# Patient Record
Sex: FEMALE | Race: OTHER | HISPANIC OR LATINO | Employment: FULL TIME | URBAN - METROPOLITAN AREA
[De-identification: names, ages, dates, MRNs, and addresses within clinical notes are randomized per-mention and may not be internally consistent; named-entity substitution may affect disease eponyms.]

---

## 2021-03-05 ENCOUNTER — TELEPHONE (OUTPATIENT)
Dept: OBGYN CLINIC | Facility: CLINIC | Age: 31
End: 2021-03-05

## 2021-03-08 ENCOUNTER — OFFICE VISIT (OUTPATIENT)
Dept: OBGYN CLINIC | Facility: CLINIC | Age: 31
End: 2021-03-08

## 2021-03-08 VITALS
DIASTOLIC BLOOD PRESSURE: 76 MMHG | BODY MASS INDEX: 23.29 KG/M2 | WEIGHT: 136.4 LBS | HEART RATE: 81 BPM | SYSTOLIC BLOOD PRESSURE: 105 MMHG | HEIGHT: 64 IN

## 2021-03-08 DIAGNOSIS — R87.619 ABNORMAL CERVICAL CYTOLOGY: Primary | ICD-10-CM

## 2021-03-08 PROCEDURE — 99202 OFFICE O/P NEW SF 15 MIN: CPT | Performed by: OBSTETRICS & GYNECOLOGY

## 2021-03-08 NOTE — PROGRESS NOTES
Assessment/Plan:     No problem-specific Assessment & Plan notes found for this encounter  Diagnoses and all orders for this visit:    Abnormal cervical cytology    Other orders  -     PARAGARD INTRAUTERINE COPPER IU; 1 Units by Intrauterine route daily    RTO to discuss pathology results with PGY3 and need for possible LEEP  Subjective:      Patient ID: Topher Rojo is a 26 y o  female present to discuss pap/colpo results  Pt is a G0 who had a low grade pap at [facility]  She subsequently was told she needed a LEEP  However, the records she was provided do not include any pathology results  We will sign a record release today and return to discuss the therapy plan  The patient does intent to attempt pregnancy with one year  HPI    The following portions of the patient's history were reviewed and updated as appropriate: allergies, current medications, past family history, past medical history, past social history, past surgical history and problem list     Review of Systems      Objective:      /76   Pulse 81   Ht 5' 4" (1 626 m)   Wt 61 9 kg (136 lb 6 4 oz)   LMP 03/02/2021 Comment: paragard  BMI 23 41 kg/m²          Physical Exam  Vitals signs and nursing note reviewed  Constitutional:       Appearance: Normal appearance  Pulmonary:      Effort: Pulmonary effort is normal    Neurological:      General: No focal deficit present  Mental Status: She is alert and oriented to person, place, and time     Psychiatric:         Mood and Affect: Mood normal          Behavior: Behavior normal

## 2021-04-08 ENCOUNTER — OFFICE VISIT (OUTPATIENT)
Dept: OBGYN CLINIC | Facility: CLINIC | Age: 31
End: 2021-04-08

## 2021-04-08 VITALS
HEART RATE: 73 BPM | SYSTOLIC BLOOD PRESSURE: 110 MMHG | DIASTOLIC BLOOD PRESSURE: 74 MMHG | BODY MASS INDEX: 22.83 KG/M2 | WEIGHT: 133 LBS

## 2021-04-08 DIAGNOSIS — N87.1 DYSPLASIA OF CERVIX, HIGH GRADE CIN 2: Primary | ICD-10-CM

## 2021-04-08 PROCEDURE — 99213 OFFICE O/P EST LOW 20 MIN: CPT | Performed by: OBSTETRICS & GYNECOLOGY

## 2021-04-08 NOTE — PROGRESS NOTES
H&P Exam - Gynecology   Claude Gonzalez 26 y o  female MRN: [id number]  Unit/Bed#:  Encounter: [id number]      History of Present Illness     HPI:  Claude Gonzalez is a 26 y o  G0 with a Paraguard in place who presents in preparation for a LEEP procedure following colposcopy at [facility] for GRAHAM 2 with glandular involvement  The patient does not have current children and is planning on future fertility  The patient inquired about delaying an excisional procedure until after completing her fertility goals  We discussed that given the glandular involvement of her pathology, the recommendation would be to do the LEEP now  However, the patient was told that she ultimately can choose to do what is best for her  She opted too proceed with the procedure, as she was planning to only begin pursuing pregnancy in one year from now and she does not want to delay the procedure for that amount of time  Counseling given on procedural technique along with risks, benefits, and alternatives to the procedure  Risks include thermal damage, pain, bleeding, infection, and acute cardiac or respiratory events  The patient is aware of the risk of IUD displacement or inadvertent cutting of IUD strings  She understands that if the strings are cut prematurely,  Patient is aware that final pathology may include a diagnosis worse than CIN2 including cancer  She denies any medical history  Review of Systems   Constitutional: Negative for activity change, appetite change, chills, diaphoresis, fatigue, fever and unexpected weight change  HENT: Negative for congestion, dental problem, drooling, ear discharge, ear pain, facial swelling, hearing loss, mouth sores, nosebleeds, postnasal drip, rhinorrhea, sinus pressure, sinus pain, sneezing, sore throat, tinnitus, trouble swallowing and voice change  Eyes: Positive for itching  Negative for visual disturbance     Respiratory: Negative for apnea, chest tightness and shortness of breath  Cardiovascular: Negative for chest pain and palpitations  Gastrointestinal: Negative for abdominal pain, anal bleeding, constipation, diarrhea, nausea, rectal pain and vomiting  Endocrine: Negative for polydipsia, polyphagia and polyuria  Genitourinary: Positive for vaginal bleeding and vaginal discharge  Negative for difficulty urinating, dyspareunia, dysuria, frequency, hematuria and vaginal pain  Musculoskeletal: Positive for neck pain  Negative for back pain and joint swelling  Skin: Negative for rash  Neurological: Negative for dizziness, seizures, numbness and headaches  Hematological: Bruises/bleeds easily  Psychiatric/Behavioral: Negative for hallucinations, sleep disturbance and suicidal ideas  The patient is not nervous/anxious  Historical Information   No past medical history on file  Past Surgical History:   Procedure Laterality Date    LASIK       OB/GYN History: G0  Family History   Problem Relation Age of Onset    Lymphoma Paternal Grandmother      Social History   Social History     Substance and Sexual Activity   Alcohol Use Not Currently     Social History     Substance and Sexual Activity   Drug Use Never     Social History     Tobacco Use   Smoking Status Never Smoker   Smokeless Tobacco Never Used       Meds/Allergies   (Not in a hospital admission)    No Known Allergies    Objective   /74   Pulse 73   Wt 60 3 kg (133 lb)   LMP 03/24/2021   BMI 22 83 kg/m²     Physical Exam  Constitutional:       General: She is not in acute distress  Appearance: Normal appearance  She is normal weight  She is not ill-appearing, toxic-appearing or diaphoretic  HENT:      Head: Normocephalic and atraumatic  Neck:      Musculoskeletal: Normal range of motion and neck supple  Cardiovascular:      Rate and Rhythm: Normal rate and regular rhythm  Heart sounds: No murmur  No friction rub  No gallop      Pulmonary:      Effort: Pulmonary effort is normal  No respiratory distress  Breath sounds: Normal breath sounds  No stridor  No wheezing, rhonchi or rales  Chest:      Chest wall: No tenderness  Musculoskeletal:         General: No swelling, tenderness, deformity or signs of injury  Right lower leg: No edema  Left lower leg: No edema  Skin:     General: Skin is warm and dry  Coloration: Skin is not jaundiced or pale  Findings: No bruising, erythema, lesion or rash  Neurological:      General: No focal deficit present  Mental Status: She is alert and oriented to person, place, and time  Psychiatric:         Mood and Affect: Mood normal          Behavior: Behavior normal          Thought Content: Thought content normal          Judgment: Judgment normal           Assessment/Plan        A/P: Toni Drummond is a 26 y o  G0 with a Paraguard in place who presents in preparation for a LEEP procedure following colposcopy at [facility] for GRAHAM 2 with glandular involvement    The patient was counseled extensively about the risks, benefits, alternatives, advantages and disadvantages of the LEEP procedure  She desires to proceed with surgery  Consent was signed today  The patient was seen and counseled with Dr Yonny Hernández       Code Status: Full Code     Jmimy Jama MD  4/8/2021  10:22 AM

## 2021-04-12 ENCOUNTER — TELEPHONE (OUTPATIENT)
Dept: OBGYN CLINIC | Facility: CLINIC | Age: 31
End: 2021-04-12

## 2021-04-12 NOTE — TELEPHONE ENCOUNTER
Left voicemail for pt to call [phone] to schedule her LEEP procedure to be done with Dr Nivia Rivera

## 2021-04-26 NOTE — PRE-PROCEDURE INSTRUCTIONS
Pre-Surgery Instructions:   Medication Instructions    Multiple Vitamin (MULTIVITAMIN ADULT PO) Instructed patient per Anesthesia Guidelines   PARAGARD INTRAUTERINE COPPER IU Instructed patient per Anesthesia Guidelines  Instructed has no medications to take the morning of surgery  No aspirin or NSAIDs before surgery starting today

## 2021-04-27 ENCOUNTER — ANESTHESIA EVENT (OUTPATIENT)
Dept: PERIOP | Facility: HOSPITAL | Age: 31
End: 2021-04-27
Payer: COMMERCIAL

## 2021-04-27 PROBLEM — Z98.890 S/P LEEP (LOOP ELECTROSURGICAL EXCISION PROCEDURE): Status: ACTIVE | Noted: 2021-04-27

## 2021-04-27 RX ORDER — SENNOSIDES 8.6 MG
650 CAPSULE ORAL EVERY 8 HOURS PRN
Qty: 30 TABLET | Refills: 0
Start: 2021-04-27

## 2021-04-27 RX ORDER — IBUPROFEN 600 MG/1
600 TABLET ORAL EVERY 6 HOURS PRN
Qty: 30 TABLET | Refills: 0
Start: 2021-04-27

## 2021-04-27 NOTE — DISCHARGE INSTRUCTIONS
Loop Electrosurgical Excision Procedure   WHAT YOU NEED TO KNOW:   What do I need to know about a loop electrosurgical excision procedure (LEEP)? A LEEP is a procedure to remove abnormal tissue from your cervix or vagina  The tissue can be tested for cancer or infection  You may need a LEEP if other tests have found abnormal cells on your cervix or in your vagina  How do I prepare for a LEEP? Your healthcare provider will talk to you about how to prepare for your procedure  Do not douche, use tampons, or have sex for 24 hours before the procedure  Do not put medicines in your vagina for 24 hours before the procedure  Call your healthcare provider if you have your menstrual period on the day of the procedure  You may need to wait until your period ends to have the procedure  Arrange for someone to drive you home and stay with you after your procedure  What will happen during a LEEP? · Your healthcare provider will insert a speculum in your vagina  A speculum is an instrument that holds the vagina open so your provider can see your cervix  You will be given local anesthesia to numb your cervix  With local anesthesia, you may feel pressure or pushing during your procedure, but you should not feel pain  · Your healthcare provider will insert a tube with a looped wire at the end into your vagina  He or she will use this instrument to remove a sample of tissue from your cervix  You may feel pain or cramping when the sample is taken  You may also feel dizzy  Tell your healthcare provider if the dizziness gets worse  Your healthcare provider may use a paste or tools to control bleeding  What will happen after a LEEP? Your healthcare provider will monitor you for heavy bleeding  You may have cramping, bleeding, or dark brown discharge after your procedure  These symptoms may last up to 4 weeks  What are the risks of a LEEP? You may bleed more than expected or get an infection   Your menstrual periods may feel more painful after the procedure  You may have problems getting pregnant or be at risk for a miscarriage or  birth  If you do get pregnant, your baby may be underweight  CARE AGREEMENT:   You have the right to help plan your care  Learn about your health condition and how it may be treated  Discuss treatment options with your healthcare providers to decide what care you want to receive  You always have the right to refuse treatment  The above information is an  only  It is not intended as medical advice for individual conditions or treatments  Talk to your doctor, nurse or pharmacist before following any medical regimen to see if it is safe and effective for you  © Copyright [address] Information is for End User's use only and may not be sold, redistributed or otherwise used for commercial purposes   All illustrations and images included in CareNotes® are the copyrighted property of [employer]  or [address] [place] [address]

## 2021-04-28 ENCOUNTER — HOSPITAL ENCOUNTER (OUTPATIENT)
Facility: HOSPITAL | Age: 31
Setting detail: OUTPATIENT SURGERY
Discharge: HOME | End: 2021-04-28
Attending: OBSTETRICS & GYNECOLOGY | Admitting: OBSTETRICS & GYNECOLOGY
Payer: COMMERCIAL

## 2021-04-28 ENCOUNTER — ANESTHESIA (OUTPATIENT)
Dept: PERIOP | Facility: HOSPITAL | Age: 31
End: 2021-04-28
Payer: COMMERCIAL

## 2021-04-28 VITALS
DIASTOLIC BLOOD PRESSURE: 52 MMHG | HEIGHT: 64 IN | WEIGHT: 133 LBS | BODY MASS INDEX: 22.71 KG/M2 | TEMPERATURE: 97.2 F | HEART RATE: 80 BPM | OXYGEN SATURATION: 98 % | RESPIRATION RATE: 18 BRPM | SYSTOLIC BLOOD PRESSURE: 91 MMHG

## 2021-04-28 DIAGNOSIS — N87.1 DYSPLASIA OF CERVIX, HIGH GRADE CIN 2: ICD-10-CM

## 2021-04-28 DIAGNOSIS — Z98.890 S/P LEEP (LOOP ELECTROSURGICAL EXCISION PROCEDURE): Primary | ICD-10-CM

## 2021-04-28 LAB
EXT PREGNANCY TEST URINE: NEGATIVE
EXT. CONTROL: NORMAL

## 2021-04-28 PROCEDURE — 88305 TISSUE EXAM BY PATHOLOGIST: CPT | Performed by: PATHOLOGY

## 2021-04-28 PROCEDURE — 57522 CONIZATION OF CERVIX: CPT | Performed by: OBSTETRICS & GYNECOLOGY

## 2021-04-28 PROCEDURE — 81025 URINE PREGNANCY TEST: CPT | Performed by: OBSTETRICS & GYNECOLOGY

## 2021-04-28 PROCEDURE — 88344 IMHCHEM/IMCYTCHM EA MLT ANTB: CPT | Performed by: PATHOLOGY

## 2021-04-28 PROCEDURE — 88307 TISSUE EXAM BY PATHOLOGIST: CPT | Performed by: PATHOLOGY

## 2021-04-28 RX ORDER — FENTANYL CITRATE 50 UG/ML
INJECTION, SOLUTION INTRAMUSCULAR; INTRAVENOUS AS NEEDED
Status: DISCONTINUED | OUTPATIENT
Start: 2021-04-28 | End: 2021-04-28

## 2021-04-28 RX ORDER — FENTANYL CITRATE/PF 50 MCG/ML
50 SYRINGE (ML) INJECTION
Status: DISCONTINUED | OUTPATIENT
Start: 2021-04-28 | End: 2021-04-28 | Stop reason: HOSPADM

## 2021-04-28 RX ORDER — HYDROMORPHONE HCL/PF 1 MG/ML
0.5 SYRINGE (ML) INJECTION
Status: DISCONTINUED | OUTPATIENT
Start: 2021-04-28 | End: 2021-04-28 | Stop reason: HOSPADM

## 2021-04-28 RX ORDER — ONDANSETRON 2 MG/ML
4 INJECTION INTRAMUSCULAR; INTRAVENOUS EVERY 6 HOURS PRN
Status: DISCONTINUED | OUTPATIENT
Start: 2021-04-28 | End: 2021-04-28 | Stop reason: HOSPADM

## 2021-04-28 RX ORDER — ONDANSETRON 2 MG/ML
4 INJECTION INTRAMUSCULAR; INTRAVENOUS ONCE AS NEEDED
Status: DISCONTINUED | OUTPATIENT
Start: 2021-04-28 | End: 2021-04-28 | Stop reason: HOSPADM

## 2021-04-28 RX ORDER — LIDOCAINE HYDROCHLORIDE 20 MG/ML
INJECTION, SOLUTION EPIDURAL; INFILTRATION; INTRACAUDAL; PERINEURAL AS NEEDED
Status: DISCONTINUED | OUTPATIENT
Start: 2021-04-28 | End: 2021-04-28

## 2021-04-28 RX ORDER — MIDAZOLAM HYDROCHLORIDE 2 MG/2ML
INJECTION, SOLUTION INTRAMUSCULAR; INTRAVENOUS AS NEEDED
Status: DISCONTINUED | OUTPATIENT
Start: 2021-04-28 | End: 2021-04-28

## 2021-04-28 RX ORDER — ACETAMINOPHEN 325 MG/1
650 TABLET ORAL EVERY 6 HOURS SCHEDULED
Status: DISCONTINUED | OUTPATIENT
Start: 2021-04-28 | End: 2021-04-28 | Stop reason: HOSPADM

## 2021-04-28 RX ORDER — SODIUM CHLORIDE 9 MG/ML
125 INJECTION, SOLUTION INTRAVENOUS CONTINUOUS
Status: DISCONTINUED | OUTPATIENT
Start: 2021-04-28 | End: 2021-04-28

## 2021-04-28 RX ORDER — KETOROLAC TROMETHAMINE 30 MG/ML
INJECTION, SOLUTION INTRAMUSCULAR; INTRAVENOUS AS NEEDED
Status: DISCONTINUED | OUTPATIENT
Start: 2021-04-28 | End: 2021-04-28

## 2021-04-28 RX ORDER — PROPOFOL 10 MG/ML
INJECTION, EMULSION INTRAVENOUS AS NEEDED
Status: DISCONTINUED | OUTPATIENT
Start: 2021-04-28 | End: 2021-04-28

## 2021-04-28 RX ADMIN — SODIUM CHLORIDE 125 ML/HR: 0.9 INJECTION, SOLUTION INTRAVENOUS at 12:50

## 2021-04-28 RX ADMIN — PROPOFOL 200 MG: 10 INJECTION, EMULSION INTRAVENOUS at 13:47

## 2021-04-28 RX ADMIN — MIDAZOLAM 2 MG: 1 INJECTION INTRAMUSCULAR; INTRAVENOUS at 13:41

## 2021-04-28 RX ADMIN — FENTANYL CITRATE 25 MCG: 50 INJECTION INTRAMUSCULAR; INTRAVENOUS at 13:41

## 2021-04-28 RX ADMIN — LIDOCAINE HYDROCHLORIDE 50 MG: 20 INJECTION, SOLUTION EPIDURAL; INFILTRATION; INTRACAUDAL; PERINEURAL at 13:47

## 2021-04-28 RX ADMIN — KETOROLAC TROMETHAMINE 30 MG: 30 INJECTION, SOLUTION INTRAMUSCULAR at 13:59

## 2021-04-28 RX ADMIN — FENTANYL CITRATE 25 MCG: 50 INJECTION INTRAMUSCULAR; INTRAVENOUS at 13:48

## 2021-04-28 NOTE — ANESTHESIA PREPROCEDURE EVALUATION
Procedure:  BIOPSY LEEP CERVIX (N/A Cervix)    Relevant Problems   Other   (+) Dysplasia of cervix, high grade GRAHAM 2   (+) S/P LEEP (loop electrosurgical excision procedure)        Physical Exam    Airway    Mallampati score: I  TM Distance: >3 FB  Neck ROM: full     Dental   No notable dental hx     Cardiovascular  Rhythm: regular, Rate: normal, Cardiovascular exam normal    Pulmonary  Pulmonary exam normal Breath sounds clear to auscultation,     Other Findings        Anesthesia Plan  ASA Score- 2     Anesthesia Type- general with ASA Monitors  Additional Monitors:   Airway Plan: LMA  Plan Factors-Exercise tolerance (METS): >4 METS  Chart reviewed  Patient summary reviewed  Patient is not a current smoker  Patient instructed to abstain from smoking on day of procedure  Obstructive sleep apnea risk education given perioperatively  Induction- intravenous  Postoperative Plan-     Informed Consent- Anesthetic plan and risks discussed with patient

## 2021-04-28 NOTE — ANESTHESIA POSTPROCEDURE EVALUATION
Post-Op Assessment Note    CV Status:  Stable    Pain management: adequate     Mental Status:  Alert and awake   Hydration Status:  Euvolemic   PONV Controlled:  Controlled   Airway Patency:  Patent      Post Op Vitals Reviewed: Yes      Staff: Anesthesiologist         No complications documented      BP     Temp      Pulse     Resp      SpO2

## 2021-04-28 NOTE — OP NOTE
OPERATIVE REPORT  PATIENT NAME: Estrella Maxwell    :  1990  MRN: [id number]  Pt Location: [facility] OR ROOM 05    SURGERY DATE: 2021    Surgeon(s) and Role:     * Heather Farmer MD - Primary     * Yadira Galvan DO - Assisting    Preop Diagnosis:  Dysplasia of cervix, high grade GRAHAM 2 [N87 1]    Post-Op Diagnosis Codes: * Dysplasia of cervix, high grade GRAHAM 2 [N87 1]    Procedure(s) (LRB):  BIOPSY LEEP CERVIX (N/A)    Specimen(s):  ID Type Source Tests Collected by Time Destination   1 : exocervix Tissue Cervix TISSUE EXAM Heather Farmer MD 2021 1356    2 : endocervical currettings Tissue Endocervical TISSUE EXAM Heather Farmer MD 2021 1356        Estimated Blood Loss: None      Anesthesia Type:   General    Operative Indications:  Dysplasia of cervix, high grade GRAHAM 2 [N87 1]      Operative Findings:  Normal appearing vulva, vagina, cervix with no gross lesions  Minimal descensus noted  IUD strings in place    Complications:   None    Procedure and Technique:    The patient was taken to the operating room  General LMA sedation was administered and found to be adequate  The patient was then positioned on the operating table in dorsolithotomy position with legs supported by stirrups and SCDs bilaterally  All pressure points were padded  Warm blanket was placed to maintain control of core body temperature  The patient was then prepped and draped in usual sterile fashion  A bimanual exam was performed and uterus was found to be small, anteverted and mobile  Operative technique: A timeout was performed to confirm correct patient and correct procedure  The bladder was emptied with a straight catheter and 5 milliliters of clear yellow urine were obtained  A bivalved coated speculum was inserted into the vagina and the cervix was visualized  The 10x10 loop electrode was selected and used to excise the cervical sample using the loop  A posterior and anterior lip were both taken   The gross specimen was removed and sent to pathology  Endocervical curettage was completed using the Gregorian curette, placed on Telfa, and also sent to pathology  Good hemostasis was confirmed  IUD strings were still noted to be in place  The bivalve speculum was removed from the vagina  At the completion of the procedure all needle, sponge, instrument counts were noted to be correct ×2  Dr Kip Barron was present for all key portions of the procedure        Patient Disposition:  PACU  and extubated and stable    SIGNATURE: Galilea Moore DO  DATE: April 28, 2021  TIME: 2:02 PM

## 2021-04-29 ENCOUNTER — TELEPHONE (OUTPATIENT)
Dept: OBGYN CLINIC | Facility: CLINIC | Age: 31
End: 2021-04-29

## 2021-04-29 NOTE — TELEPHONE ENCOUNTER
Spoke to patient on the phone, she wanted to know when she could start exercising after her procedure  Discussed with Dr Darlin Calle  She can resume exercising after 1-2 days

## 2021-05-12 ENCOUNTER — TELEPHONE (OUTPATIENT)
Dept: OBGYN CLINIC | Facility: CLINIC | Age: 31
End: 2021-05-12

## 2021-05-14 ENCOUNTER — TELEPHONE (OUTPATIENT)
Dept: OBGYN CLINIC | Facility: CLINIC | Age: 31
End: 2021-05-14

## 2021-05-14 NOTE — TELEPHONE ENCOUNTER
Called patient to offer post-op appointment, left message via voicemail asking patient to call office

## 2021-05-18 ENCOUNTER — OFFICE VISIT (OUTPATIENT)
Dept: OBGYN CLINIC | Facility: CLINIC | Age: 31
End: 2021-05-18

## 2021-05-18 VITALS
SYSTOLIC BLOOD PRESSURE: 110 MMHG | DIASTOLIC BLOOD PRESSURE: 67 MMHG | WEIGHT: 132 LBS | BODY MASS INDEX: 22.66 KG/M2 | HEART RATE: 88 BPM

## 2021-05-18 DIAGNOSIS — N87.1 DYSPLASIA OF CERVIX, HIGH GRADE CIN 2: Primary | ICD-10-CM

## 2021-05-18 PROCEDURE — 99024 POSTOP FOLLOW-UP VISIT: CPT | Performed by: OBSTETRICS & GYNECOLOGY

## 2021-05-18 NOTE — ASSESSMENT & PLAN NOTE
The leep showed GRAHAM 3   The margins are +  She will need repeat pap in 4-6 months    Exam today   The cervix is healing well  There is no sign of infection     Follow up in sept for repeat pap

## 2021-05-18 NOTE — PROGRESS NOTES
Problem List Items Addressed This Visit        Genitourinary    Dysplasia of cervix, high grade GRAHAM 2 - Primary     The leep showed GRAHAM 3   The margins are +  She will need repeat pap in 4-6 months    Exam today   The cervix is healing well  There is no sign of infection     Follow up in sept for repeat pap

## 2021-09-07 ENCOUNTER — ANNUAL EXAM (OUTPATIENT)
Dept: OBGYN CLINIC | Facility: CLINIC | Age: 31
End: 2021-09-07

## 2021-09-07 VITALS
HEART RATE: 66 BPM | WEIGHT: 132.8 LBS | BODY MASS INDEX: 22.8 KG/M2 | SYSTOLIC BLOOD PRESSURE: 95 MMHG | DIASTOLIC BLOOD PRESSURE: 63 MMHG

## 2021-09-07 DIAGNOSIS — Z01.419 WOMEN'S ANNUAL ROUTINE GYNECOLOGICAL EXAMINATION: ICD-10-CM

## 2021-09-07 DIAGNOSIS — Z01.00 ENCOUNTER FOR VISUAL TESTING: Primary | ICD-10-CM

## 2021-09-07 PROCEDURE — G0476 HPV COMBO ASSAY CA SCREEN: HCPCS | Performed by: OBSTETRICS & GYNECOLOGY

## 2021-09-07 PROCEDURE — G0145 SCR C/V CYTO,THINLAYER,RESCR: HCPCS | Performed by: OBSTETRICS & GYNECOLOGY

## 2021-09-07 PROCEDURE — 0503F POSTPARTUM CARE VISIT: CPT | Performed by: OBSTETRICS & GYNECOLOGY

## 2021-09-07 PROCEDURE — 99213 OFFICE O/P EST LOW 20 MIN: CPT | Performed by: OBSTETRICS & GYNECOLOGY

## 2021-09-07 RX ORDER — ERYTHROMYCIN 5 MG/G
OINTMENT OPHTHALMIC
COMMUNITY
Start: 2021-08-17

## 2021-09-07 NOTE — PROGRESS NOTES
[facility]  Name: Valentino Laurel  MRN: [id number]  : 1990      SUBJECTIVE:     Valentino Laurel is a 33 y o  G0 who presents for annual well woman exam      Contraception: Paragard IUD (since 2020)    Reports periods are heavy and crampy  LMP about 1 month  Cycles have not been super regular since getting the Paragard  S/p LEEP in May 2021 for CIN2 w/ glandular involvement  LEEP = CIN2 w/ positive margins  Currently sexually active w/ 1 male partner  Declines STD testing  Interested in future fertility  Denies any issues/concerns today  Review of Systems  Pertinent items are noted in HPI  OBJECTIVE:     BP 95/63   Pulse 66   Wt 60 2 kg (132 lb 12 8 oz)   LMP 2021 (Exact Date)   BMI 22 80 kg/m²     Physical Exam  Constitutional:       General: She is not in acute distress  Appearance: She is not ill-appearing  HENT:      Head: Normocephalic and atraumatic  Cardiovascular:      Rate and Rhythm: Normal rate  Pulmonary:      Effort: Pulmonary effort is normal  No respiratory distress  Chest:      Breasts:         Right: Normal  No bleeding, mass, nipple discharge, skin change or tenderness  Left: Normal  No bleeding, mass, nipple discharge, skin change or tenderness  Abdominal:      General: Abdomen is flat  There is no distension  Palpations: Abdomen is soft  Tenderness: There is no abdominal tenderness  Genitourinary:     Comments:   Brown discharge visualized; some bleeding noted after pap  Skin:     General: Skin is warm and dry  Neurological:      Mental Status: She is alert and oriented to person, place, and time     Psychiatric:         Mood and Affect: Mood normal          Behavior: Behavior normal          ASSESSMENT/PLAN:  - Pap smear collected; discussed high possibility of needing colposcopy given recent LEEP w/ CIN2 w/ positive margins  - Discussed alternate forms of contraception w/ better bleeding profiles (ie Mirena IUD)  Patient will think about it and reach out to office        Vj Partida MD  OB/GYN PGY-4  9/7/2021

## 2021-09-08 LAB
HPV HR 12 DNA CVX QL NAA+PROBE: NEGATIVE
HPV16 DNA CVX QL NAA+PROBE: NEGATIVE
HPV18 DNA CVX QL NAA+PROBE: NEGATIVE

## 2021-09-10 LAB
LAB AP GYN PRIMARY INTERPRETATION: NORMAL
Lab: NORMAL

## 2021-09-20 ENCOUNTER — TELEPHONE (OUTPATIENT)
Dept: OBGYN CLINIC | Facility: CLINIC | Age: 31
End: 2021-09-20

## 2021-09-20 NOTE — TELEPHONE ENCOUNTER
----- Message from Tomas Sanders MD sent at 9/19/2021 10:10 PM EDT -----  Negative pap and HPV  Will need co-testing again in 5 years

## 2022-02-24 ENCOUNTER — OFFICE VISIT (OUTPATIENT)
Dept: OBGYN CLINIC | Facility: CLINIC | Age: 32
End: 2022-02-24
Payer: COMMERCIAL

## 2022-02-24 VITALS — BODY MASS INDEX: 22.31 KG/M2 | WEIGHT: 130 LBS | SYSTOLIC BLOOD PRESSURE: 115 MMHG | DIASTOLIC BLOOD PRESSURE: 70 MMHG

## 2022-02-24 DIAGNOSIS — N92.0 MENORRHAGIA WITH REGULAR CYCLE: Primary | ICD-10-CM

## 2022-02-24 PROCEDURE — 99213 OFFICE O/P EST LOW 20 MIN: CPT | Performed by: OBSTETRICS & GYNECOLOGY

## 2022-02-28 NOTE — PROGRESS NOTES
IUD issues    Pt reports that she is having a lot of cramping and bleeding  She is considering removing the IUD  She will be thinking about get pregnant in the next few months also     Sonogram confirms that the iud is in the correct spot    We went over other options such as removing the IUD and then switching to Pills or another IUD  She is unsure bc her goal will be to get pregnant soon    Also went over that she had a pap that was negative after LEEP  She will need one yearly x 3 next is due in September will come for   Also will decided then if she is ready to try

## 2022-07-29 ENCOUNTER — PROCEDURE VISIT (OUTPATIENT)
Dept: OBGYN CLINIC | Facility: MEDICAL CENTER | Age: 32
End: 2022-07-29
Payer: COMMERCIAL

## 2022-07-29 VITALS
SYSTOLIC BLOOD PRESSURE: 124 MMHG | WEIGHT: 132 LBS | BODY MASS INDEX: 22.53 KG/M2 | DIASTOLIC BLOOD PRESSURE: 70 MMHG | HEIGHT: 64 IN

## 2022-07-29 DIAGNOSIS — Z30.432 ENCOUNTER FOR IUD REMOVAL: Primary | ICD-10-CM

## 2022-07-29 RX ORDER — MULTIVIT-MIN/FOLIC/VIT K/LYCOP 400-300MCG
1 TABLET ORAL DAILY
COMMUNITY

## 2022-07-31 PROCEDURE — 58301 REMOVE INTRAUTERINE DEVICE: CPT | Performed by: OBSTETRICS & GYNECOLOGY

## 2022-08-01 NOTE — PROGRESS NOTES
Iud removal    Date/Time: 7/31/2022 9:02 PM  Performed by: Saritha Moura MD  Authorized by: Saritha Moura MD   Universal Protocol:  Consent: Verbal consent obtained  Written consent obtained  Risks and benefits: risks, benefits and alternatives were discussed  Consent given by: patient  Time out: Immediately prior to procedure a "time out" was called to verify the correct patient, procedure, equipment, support staff and site/side marked as required  Patient understanding: patient states understanding of the procedure being performed  Patient consent: the patient's understanding of the procedure matches consent given  Procedure consent: procedure consent matches procedure scheduled  Site marked: the operative site was not marked  Radiology Images displayed and confirmed   If images not available, report reviewed: imaging studies not available  Required items: required blood products, implants, devices, and special equipment available  Patient identity confirmed: verbally with patient      Procedure:     Removed with no complications: yes      Removal due to mechanical complications of IUD: no      Removal due to infection and inflammatory reaction: no      Other reason for removal:  Pt wishes to start a family

## 2022-09-08 ENCOUNTER — ANNUAL EXAM (OUTPATIENT)
Dept: OBGYN CLINIC | Facility: CLINIC | Age: 32
End: 2022-09-08

## 2022-09-08 VITALS
HEART RATE: 60 BPM | WEIGHT: 133.2 LBS | DIASTOLIC BLOOD PRESSURE: 59 MMHG | SYSTOLIC BLOOD PRESSURE: 93 MMHG | BODY MASS INDEX: 22.74 KG/M2 | HEIGHT: 64 IN

## 2022-09-08 DIAGNOSIS — Z98.890 S/P LEEP (LOOP ELECTROSURGICAL EXCISION PROCEDURE): Primary | ICD-10-CM

## 2022-09-08 DIAGNOSIS — Z78.9 ATTEMPTING TO CONCEIVE: ICD-10-CM

## 2022-09-08 DIAGNOSIS — Z01.419 WELL WOMAN EXAM: ICD-10-CM

## 2022-09-08 PROCEDURE — G0476 HPV COMBO ASSAY CA SCREEN: HCPCS

## 2022-09-08 PROCEDURE — G0145 SCR C/V CYTO,THINLAYER,RESCR: HCPCS

## 2022-09-08 RX ORDER — PRENATAL WITH FERROUS FUM AND FOLIC ACID 3080; 920; 120; 400; 22; 1.84; 3; 20; 10; 1; 12; 200; 27; 25; 2 [IU]/1; [IU]/1; MG/1; [IU]/1; MG/1; MG/1; MG/1; MG/1; MG/1; MG/1; UG/1; MG/1; MG/1; MG/1; MG/1
1 TABLET ORAL DAILY
Qty: 30 TABLET | Refills: 2 | Status: SHIPPED | OUTPATIENT
Start: 2022-09-08

## 2022-09-08 NOTE — PROGRESS NOTES
ASSESSMENT & PLAN: Elizabeth Hudson is a 27 y o  Kori Pardo with normal gynecologic exam     1   Routine well woman exam done today  2    Pap and HPV:Pap with HPV was done today  Current ASCCP Guidelines reviewed  Last Pap 2021 NILM, HPV negative  History of previous LEEP in 2021 for GRAHAM 2  3  The patient declined STD testing  Safe sex practices have been discussed  4  The patient is sexually active  She declined contraception as she is currently attempting conceive  5  The following were reviewed in today's visit: STD testing, exercise and healthy diet  6  Patient to return to office in 12 months for annual visit   All questions have been answered to her satisfaction  CC:  Annual Gynecologic Examination    HPI: Elizabeth Hudson is a 27 y o  Kori Pardo who presents for annual gynecologic examination  She denies any concerns at this time  Reports a history of GRAHAM 2 and LEEP in 2021  She had a Pap in 2021 that was NILM and HPV negative  Repeat PAP/HPV collecting today  Ezell Paget had her IUD (Suman Cj) removed in 2021 because she is attempting to conceive  Reports that she had her menses in August and it was lighter and less painful since ParaGard removal  Encouraged to begin prenatal vitamins, Rx sent to pharmacy  Health Maintenance:    She exercises 3 days per week  She wears her seatbelt routinely  She does perform irregular monthly self breast exams  She feels safe at home  Patients does not follow a  diet  Generally avoids sugar  No past medical history on file  Past Surgical History:   Procedure Laterality Date    LASIK      UT CONIZATION CERVIX,LOOP ELECTRD N/A 2021    Procedure: BIOPSY LEEP CERVIX;  Surgeon: Saritha Moura MD;  Location: [facility] OR;  Service: Gynecology       Past OB/Gyn History:   Patient's last menstrual period was 2022 (exact date)    Menstrual History:  OB History        0    Para   0    Term   0       0    AB   0    Living 0       SAB   0    IAB   0    Ectopic   0    Multiple   0    Live Births   0                Patient's last menstrual period was 08/07/2022 (exact date)  History of sexually transmitted infection No  Patient is currently sexually active  heterosexual Birth control: none  Last Pap     Family History   Problem Relation Age of Onset    Lymphoma Paternal Grandmother        Social History:  Social History     Socioeconomic History    Marital status:      Spouse name: Not on file    Number of children: Not on file    Years of education: Not on file    Highest education level: Not on file   Occupational History    Not on file   Tobacco Use    Smoking status: Never Smoker    Smokeless tobacco: Never Used   Vaping Use    Vaping Use: Never used   Substance and Sexual Activity    Alcohol use: Yes    Drug use: Never    Sexual activity: Yes   Other Topics Concern    Not on file   Social History Narrative    Not on file     Social Determinants of Health     Financial Resource Strain: Low Risk     Difficulty of Paying Living Expenses: Not hard at all   Food Insecurity: No Food Insecurity    Worried About Running Out of Food in the Last Year: Never true    [address] in the Last Year: Never true   Transportation Needs: No Transportation Needs    Lack of Transportation (Medical): No    Lack of Transportation (Non-Medical): No   Physical Activity: Not on file   Stress: Not on file   Social Connections: Not on file   Intimate Partner Violence: Not on file   Housing Stability: Not on file     Presently lives with her boyfriend  Patient is co-habitating  Patient is currently employed as an  in an insurance company       No Known Allergies    Current Outpatient Medications:     acetaminophen (TYLENOL) 650 mg CR tablet, Take 1 tablet (650 mg total) by mouth every 8 (eight) hours as needed for mild pain (Patient not taking: No sig reported), Disp: 30 tablet, Rfl: 0    erythromycin (ILOTYCIN) ophthalmic ointment, Administer to the left lower eye lid nightly for 7 days (Patient not taking: No sig reported), Disp: , Rfl:     ibuprofen (MOTRIN) 600 mg tablet, Take 1 tablet (600 mg total) by mouth every 6 (six) hours as needed for moderate pain (Patient not taking: Reported on 9/8/2022), Disp: 30 tablet, Rfl: 0    Multiple Vitamin (MULTIVITAMIN ADULT PO), Take 1 tablet by mouth daily (Patient not taking: Reported on 9/8/2022), Disp: , Rfl:     PARAGARD INTRAUTERINE COPPER IU, 1 Units by Intrauterine route daily (Patient not taking: Reported on 9/8/2022), Disp: , Rfl:     Pediatric Multivitamins-Iron (Multivitamins Plus Iron Child) 18 MG CHEW, Chew 1 tablet daily (Patient not taking: Reported on 9/8/2022), Disp: , Rfl:     Review of Systems:  Review of Systems   Constitutional: Negative  HENT: Negative  Eyes: Negative  Respiratory: Negative  Cardiovascular: Negative  Gastrointestinal: Negative  Endocrine: Negative  Genitourinary: Negative for vaginal discharge and vaginal pain  Musculoskeletal: Negative  Skin: Negative  Allergic/Immunologic: Negative  Neurological: Positive for headaches  Hematological: Negative  Psychiatric/Behavioral: Negative  Physical Exam:  BP 93/59   Pulse 60   Ht 5' 4" (1 626 m)   Wt 60 4 kg (133 lb 3 2 oz)   LMP 08/07/2022 (Exact Date)   BMI 22 86 kg/m²    Physical Exam  Constitutional:       General: She is not in acute distress  Appearance: Normal appearance  Genitourinary:      Right Labia: No rash, tenderness or lesions  Left Labia: No tenderness, lesions or rash  No labial fusion noted  No vaginal discharge, tenderness or bleeding  HENT:      Head: Normocephalic and atraumatic  Cardiovascular:      Rate and Rhythm: Normal rate and regular rhythm  Heart sounds: Normal heart sounds  Pulmonary:      Effort: Pulmonary effort is normal       Breath sounds: Normal breath sounds     Abdominal: Palpations: Abdomen is soft  Tenderness: There is no abdominal tenderness  Neurological:      General: No focal deficit present  Mental Status: She is alert  Skin:     General: Skin is warm and dry     Psychiatric:         Mood and Affect: Mood normal

## 2022-09-14 LAB
LAB AP GYN PRIMARY INTERPRETATION: NORMAL
LAB AP LMP: NORMAL
Lab: NORMAL

## 2022-11-07 ENCOUNTER — APPOINTMENT (OUTPATIENT)
Dept: LAB | Facility: CLINIC | Age: 32
End: 2022-11-07

## 2022-11-07 ENCOUNTER — TELEPHONE (OUTPATIENT)
Dept: OBGYN CLINIC | Facility: CLINIC | Age: 32
End: 2022-11-07

## 2022-11-07 DIAGNOSIS — N92.6 MISSED MENSES: ICD-10-CM

## 2022-11-07 DIAGNOSIS — N92.6 MISSED MENSES: Primary | ICD-10-CM

## 2022-11-07 LAB
ABO GROUP BLD: NORMAL
B-HCG SERPL-ACNC: ABNORMAL MIU/ML
BLD GP AB SCN SERPL QL: NEGATIVE
PROGEST SERPL-MCNC: 20 NG/ML
RH BLD: POSITIVE
SPECIMEN EXPIRATION DATE: NORMAL

## 2022-11-07 NOTE — TELEPHONE ENCOUNTER
Pt requested appointment due to positive pregnancy test      LMP 9/13/2022    Placed labs for patient to get drawn; instructed pt that we will call her with her lab results after a provider is able to review them  May take 3-5 buiness days once resulted

## 2022-11-09 ENCOUNTER — TELEPHONE (OUTPATIENT)
Dept: OBGYN CLINIC | Facility: MEDICAL CENTER | Age: 32
End: 2022-11-09

## 2022-11-09 NOTE — TELEPHONE ENCOUNTER
Pt called in would like to review lab results from 11/7 and set up dating u/s, please review when available thank you

## 2022-11-14 ENCOUNTER — TELEPHONE (OUTPATIENT)
Dept: OBGYN CLINIC | Facility: MEDICAL CENTER | Age: 32
End: 2022-11-14

## 2022-11-14 DIAGNOSIS — Z32.01 POSITIVE BLOOD PREGNANCY TEST: Primary | ICD-10-CM

## 2022-11-14 NOTE — TELEPHONE ENCOUNTER
----- Message from Imelda Irvin MD sent at 11/14/2022  9:35 AM EST -----  HCG level reviewed  The level is [address]  Set up ultrasound for dating

## 2022-11-15 ENCOUNTER — HOSPITAL ENCOUNTER (OUTPATIENT)
Dept: ULTRASOUND IMAGING | Facility: HOSPITAL | Age: 32
Discharge: HOME | End: 2022-11-15
Attending: OBSTETRICS & GYNECOLOGY

## 2022-11-15 DIAGNOSIS — Z32.01 POSITIVE BLOOD PREGNANCY TEST: ICD-10-CM

## 2022-11-18 ENCOUNTER — TELEPHONE (OUTPATIENT)
Dept: OBGYN CLINIC | Facility: MEDICAL CENTER | Age: 32
End: 2022-11-18

## 2022-12-12 ENCOUNTER — INITIAL PRENATAL (OUTPATIENT)
Dept: OBGYN CLINIC | Facility: MEDICAL CENTER | Age: 32
End: 2022-12-12

## 2022-12-12 VITALS
BODY MASS INDEX: 21.17 KG/M2 | SYSTOLIC BLOOD PRESSURE: 104 MMHG | DIASTOLIC BLOOD PRESSURE: 60 MMHG | HEIGHT: 64 IN | WEIGHT: 124 LBS

## 2022-12-12 DIAGNOSIS — Z34.01 ENCOUNTER FOR SUPERVISION OF NORMAL FIRST PREGNANCY IN FIRST TRIMESTER: Primary | ICD-10-CM

## 2022-12-12 NOTE — PROGRESS NOTES
OB History    Para Term  AB Living   1 0 0 0 0 0   SAB IAB Ectopic Multiple Live Births   0 0 0 0 0      # Outcome Date GA Lbr Melchor/2nd Weight Sex Delivery Anes PTL Lv   1 Current                  * Pt presents for OB intake  *  *Pt's LMP was Patient's last menstrual period was 2022  *Ultrasound date:11/15/2022   8weeks 1days  *Estimated date of delivery: 2023   * confirmed by us    *Signs/Symptoms of Pregnancy   *no Constipation    *no Headaches   *no Cramping  *no Spotting   Diabetes     *no Hx of GDM    *no BMI >35    *no First degree relative with type 2 diabetes   Hypertension-   *no Hx of chronic HTN   *Infection Screening   *no Does the pt have a hx of MRSA? *no History of herpes? *Immunizations:   *yes Discussed influenza vaccine     declined   *yes Discussed TDaP vaccine   *yes COVID Vaccine Received   ACTIVE MEDICAL/MENTAL HEALTH CONDITIONS  Depression *n   Anxiety*n  Medications*n      *Interview education   *Handouts given:    *Baby [facility] Me support center     *MyChart sign up instructions    *Lab Locations    *[facility] Pediatricians List/Choosing Pediatrician Sheet    *[facility] Childbirth [facility] Parenting Classes    *Schedule for Prenatal Visits    *Pregnancy Warning Signs Reviewed    *Safe Medications During Pregnancy    *SMA and CF Testing information sheet    *CPT Code Sheet/MFM 13week NT pamphlet    *[employer]      SBIRT Screen:  Depression Screening Follow-up Plan: Patient's depression screening was negative with an [place] score of  2        *[facility]   *yes discussed genetic testing- pt interested   Patient has been informed of basic prenatal advice such as avoiding alcohol, drugs, and smoking  She should remain hydrated and take daily prenatal vitamins   Patient should avoid caffeine, raw sprouts, high mercury fish, undercooked fish, raw eggs, organ meat, unwashed produce, and unpasteurized cheeses, milk, and fruit juice and undercooked meats  She has been informed about toxoplasmosis and to avoid cat feces  *Details that I feel the provider should be aware of:  Titus Malcolm was seen for her ob intake at 12w  Prenatal panel ordered  MFM referral placed     PN1 visit scheduled for *12/20/2022  The patient was oriented to our practice and all questions were answered      Interviewed by:  Otis Edwards

## 2022-12-19 ENCOUNTER — TELEPHONE (OUTPATIENT)
Dept: OBGYN CLINIC | Facility: MEDICAL CENTER | Age: 32
End: 2022-12-19

## 2022-12-19 PROBLEM — O34.41: Status: ACTIVE | Noted: 2022-12-19

## 2022-12-19 PROBLEM — Z3A.13 13 WEEKS GESTATION OF PREGNANCY: Status: ACTIVE | Noted: 2022-12-19

## 2022-12-19 PROBLEM — Z98.890: Status: ACTIVE | Noted: 2022-12-19

## 2022-12-19 NOTE — PROGRESS NOTES
Prenatal H&P     Denies loss of fluid, vaginal discharge, vaginal bleeding  and abdominal pain  Not feeling fetal movement yet    Tolerating PNV magnesium and probiotics well  Had prenatal panel drawn this morning  CBC and blood type reviewed  Plans for genetic screening has appointment 22  Planned pregnancy    OB history:     G1- current     Dating:     LMP - 22 WILDER 23     US on 11/15/22 @  8w 1d WILDER 23  Working WILDER 23      Surgical history:     LEEP (21), COVID (2021)  and lasik     Medical History:     GRAHAM 2    Social history:     Alcohol/ tobacco/ illicit drug -denies x3     Denies history of STD/STI     Work/ housing/ support - / house- stable/ FOB, family and friends supportive     PCP/ Dental- last PE 6 months ago/ last cleaning 3 months ago      SBIRT screen negative at intake    She denies a hx of recent cough, chills, fever,  STD/STI, denies a personal history  of TB or Zika virus or close contacts with persons with TB or COVID -23  She denies a family history of inheritable conditions such as physical or intellectual disabilities, birth defects, blood disorders, heart or neural tube defects  She has never had MRSA  Patient Plans   - Feeding-breast-feeding  - Contraception-OCPs  - [facility]ware office delivers at [facility]  If < 32 weeks will recommend evaluation at [address] [place] [address] St:  - Continue prenatal vitamin daily  - Genetic screening/ [facility] appointment 22  -  Weight gain in pregnancy- Who BMI recommend 25-35 lb   Healthy diet and daily exercise reviewed and encouraged  - Unit regimen reviewed visit every 4 weeks until 28 weeks, every 2 weeks until 36 weeks and then weekly until delivery  - gonorrhea/ chlamydia collected    Pap smear up-to-date  - Vaccines in Pregnancy      - TDAP vaccine after 27 gestational weeks     - Reviewed with patient  Pregnancy with COVID infection is considered  high risk and can have poor outcome including  delivery, more severe infection  therefore  pregnant patients are recommended to get  COVID-19 vaccination  Written information provided  Had vaccine x 1     - influenza October- March declines influenza vaccine  -  Common discomforts of pregnancy and precautions reviewed  Signs and symptoms to report reviewed  Encouraged social distancing, good hand hygiene, masking, avoiding crowds and written information provided about COVID-19    RTO 4 weeks f/u labs & US

## 2022-12-20 ENCOUNTER — APPOINTMENT (OUTPATIENT)
Dept: LAB | Facility: CLINIC | Age: 32
End: 2022-12-20

## 2022-12-20 ENCOUNTER — INITIAL PRENATAL (OUTPATIENT)
Dept: OBGYN CLINIC | Facility: CLINIC | Age: 32
End: 2022-12-20

## 2022-12-20 VITALS
HEIGHT: 64 IN | SYSTOLIC BLOOD PRESSURE: 102 MMHG | WEIGHT: 126 LBS | BODY MASS INDEX: 21.51 KG/M2 | DIASTOLIC BLOOD PRESSURE: 62 MMHG

## 2022-12-20 DIAGNOSIS — Z11.3 ROUTINE SCREENING FOR STI (SEXUALLY TRANSMITTED INFECTION): ICD-10-CM

## 2022-12-20 DIAGNOSIS — O34.41 HX OF LEEP (LOOP ELECTROSURGICAL EXCISION PROCEDURE) OF CERVIX COMPLICATING PREGNANCY, FIRST TRIMESTER: Primary | ICD-10-CM

## 2022-12-20 DIAGNOSIS — Z3A.13 13 WEEKS GESTATION OF PREGNANCY: ICD-10-CM

## 2022-12-20 DIAGNOSIS — Z34.01 ENCOUNTER FOR SUPERVISION OF NORMAL FIRST PREGNANCY IN FIRST TRIMESTER: ICD-10-CM

## 2022-12-20 DIAGNOSIS — Z98.890 HX OF LEEP (LOOP ELECTROSURGICAL EXCISION PROCEDURE) OF CERVIX COMPLICATING PREGNANCY, FIRST TRIMESTER: Primary | ICD-10-CM

## 2022-12-20 LAB
ABO GROUP BLD: NORMAL
BACTERIA UR QL AUTO: ABNORMAL /HPF
BASOPHILS # BLD AUTO: 0.07 THOUSANDS/ÂΜL (ref 0–0.1)
BASOPHILS NFR BLD AUTO: 1 % (ref 0–1)
BILIRUB UR QL STRIP: NEGATIVE
BLD GP AB SCN SERPL QL: NEGATIVE
BUDDING YEAST: PRESENT
CLARITY UR: ABNORMAL
COLOR UR: YELLOW
EOSINOPHIL # BLD AUTO: 0.2 THOUSAND/ÂΜL (ref 0–0.61)
EOSINOPHIL NFR BLD AUTO: 3 % (ref 0–6)
ERYTHROCYTE [DISTWIDTH] IN BLOOD BY AUTOMATED COUNT: 13.1 % (ref 11.6–15.1)
GLUCOSE UR STRIP-MCNC: NEGATIVE MG/DL
HBV SURFACE AG SER QL: NORMAL
HCT VFR BLD AUTO: 35.2 % (ref 34.8–46.1)
HGB BLD-MCNC: 11.7 G/DL (ref 11.5–15.4)
HGB UR QL STRIP.AUTO: NEGATIVE
HIV 1+2 AB+HIV1 P24 AG SERPL QL IA: NORMAL
HIV 2 AB SERPL QL IA: NORMAL
HIV1 AB SERPL QL IA: NORMAL
HIV1 P24 AG SERPL QL IA: NORMAL
IMM GRANULOCYTES # BLD AUTO: 0.01 THOUSAND/UL (ref 0–0.2)
IMM GRANULOCYTES NFR BLD AUTO: 0 % (ref 0–2)
KETONES UR STRIP-MCNC: NEGATIVE MG/DL
LEUKOCYTE ESTERASE UR QL STRIP: ABNORMAL
LYMPHOCYTES # BLD AUTO: 1.92 THOUSANDS/ÂΜL (ref 0.6–4.47)
LYMPHOCYTES NFR BLD AUTO: 28 % (ref 14–44)
MCH RBC QN AUTO: 30.6 PG (ref 26.8–34.3)
MCHC RBC AUTO-ENTMCNC: 33.2 G/DL (ref 31.4–37.4)
MCV RBC AUTO: 92 FL (ref 82–98)
MONOCYTES # BLD AUTO: 0.35 THOUSAND/ÂΜL (ref 0.17–1.22)
MONOCYTES NFR BLD AUTO: 5 % (ref 4–12)
NEUTROPHILS # BLD AUTO: 4.42 THOUSANDS/ÂΜL (ref 1.85–7.62)
NEUTS SEG NFR BLD AUTO: 63 % (ref 43–75)
NITRITE UR QL STRIP: NEGATIVE
NON-SQ EPI CELLS URNS QL MICRO: ABNORMAL /HPF
NRBC BLD AUTO-RTO: 0 /100 WBCS
PH UR STRIP.AUTO: 7.5 [PH]
PLATELET # BLD AUTO: 229 THOUSANDS/UL (ref 149–390)
PMV BLD AUTO: 10.7 FL (ref 8.9–12.7)
PROT UR STRIP-MCNC: ABNORMAL MG/DL
RBC # BLD AUTO: 3.82 MILLION/UL (ref 3.81–5.12)
RBC #/AREA URNS AUTO: ABNORMAL /HPF
RH BLD: POSITIVE
RPR SER QL: NORMAL
RUBV IGG SERPL IA-ACNC: 96.3 IU/ML
SP GR UR STRIP.AUTO: 1.02 (ref 1–1.03)
SPECIMEN EXPIRATION DATE: NORMAL
UROBILINOGEN UR STRIP-ACNC: <2 MG/DL
WBC # BLD AUTO: 6.97 THOUSAND/UL (ref 4.31–10.16)
WBC #/AREA URNS AUTO: ABNORMAL /HPF

## 2022-12-20 RX ORDER — MAGNESIUM 30 MG
30 TABLET ORAL 2 TIMES DAILY
COMMUNITY

## 2022-12-20 RX ORDER — SACCHAROMYCES BOULARDII 250 MG
250 CAPSULE ORAL 2 TIMES DAILY
COMMUNITY

## 2022-12-21 LAB
BACTERIA UR CULT: NORMAL
C TRACH DNA SPEC QL NAA+PROBE: NEGATIVE
N GONORRHOEA DNA SPEC QL NAA+PROBE: NEGATIVE

## 2022-12-23 ENCOUNTER — TELEPHONE (OUTPATIENT)
Dept: PERINATAL CARE | Facility: CLINIC | Age: 32
End: 2022-12-23

## 2022-12-23 NOTE — TELEPHONE ENCOUNTER
Lvm regarding rescheduling her genetic counseling appt as an individual with [facility], instead of a class per [facility] staff message  Waiting on patient to call MFM to reschedule

## 2022-12-27 ENCOUNTER — TELEPHONE (OUTPATIENT)
Dept: PERINATAL CARE | Facility: OTHER | Age: 32
End: 2022-12-27

## 2022-12-27 NOTE — TELEPHONE ENCOUNTER
lvm 12/27 at 0945> Patient returned our call on 12/24 at [phone] to re schedule her GC appointment to a individual appointment  # provided to patient on VM to return call

## 2023-01-18 PROBLEM — O34.42 HISTORY OF LOOP ELECTROSURGICAL EXCISION PROCEDURE (LEEP) OF CERVIX AFFECTING PREGNANCY IN SECOND TRIMESTER: Status: ACTIVE | Noted: 2022-12-19

## 2023-01-18 PROBLEM — Z3A.17 17 WEEKS GESTATION OF PREGNANCY: Status: ACTIVE | Noted: 2022-12-19

## 2023-01-23 ENCOUNTER — APPOINTMENT (OUTPATIENT)
Dept: LAB | Facility: CLINIC | Age: 33
End: 2023-01-23

## 2023-01-23 ENCOUNTER — TELEMEDICINE (OUTPATIENT)
Dept: PERINATAL CARE | Facility: CLINIC | Age: 33
End: 2023-01-23

## 2023-01-23 ENCOUNTER — ROUTINE PRENATAL (OUTPATIENT)
Dept: OBGYN CLINIC | Facility: MEDICAL CENTER | Age: 33
End: 2023-01-23

## 2023-01-23 VITALS
BODY MASS INDEX: 22.36 KG/M2 | DIASTOLIC BLOOD PRESSURE: 64 MMHG | SYSTOLIC BLOOD PRESSURE: 102 MMHG | WEIGHT: 131 LBS | HEIGHT: 64 IN

## 2023-01-23 DIAGNOSIS — Z31.430 ENCOUNTER OF FEMALE FOR TESTING FOR GENETIC DISEASE CARRIER STATUS FOR PROCREATIVE MANAGEMENT: ICD-10-CM

## 2023-01-23 DIAGNOSIS — O35.2XX0 HEREDITARY DISEASE IN FAMILY POSSIBLY AFFECTING FETUS, AFFECTING MANAGEMENT OF MOTHER IN PREGNANCY, SINGLE OR UNSPECIFIED FETUS: Primary | ICD-10-CM

## 2023-01-23 DIAGNOSIS — O09.512 PRIMIGRAVIDA OF ADVANCED MATERNAL AGE IN SECOND TRIMESTER: ICD-10-CM

## 2023-01-23 DIAGNOSIS — Z3A.17 17 WEEKS GESTATION OF PREGNANCY: ICD-10-CM

## 2023-01-23 DIAGNOSIS — Z36.1 ENCOUNTER FOR ANTENATAL SCREENING FOR HIGH ALPHA-FETOPROTEIN LEVEL: ICD-10-CM

## 2023-01-23 DIAGNOSIS — Z98.890 HISTORY OF LOOP ELECTROSURGICAL EXCISION PROCEDURE (LEEP) OF CERVIX AFFECTING PREGNANCY IN SECOND TRIMESTER: Primary | ICD-10-CM

## 2023-01-23 DIAGNOSIS — Z31.5 ENCOUNTER FOR PROCREATIVE GENETIC COUNSELING: ICD-10-CM

## 2023-01-23 DIAGNOSIS — O34.42 HISTORY OF LOOP ELECTROSURGICAL EXCISION PROCEDURE (LEEP) OF CERVIX AFFECTING PREGNANCY IN SECOND TRIMESTER: Primary | ICD-10-CM

## 2023-01-23 PROCEDURE — NC001 PR NO CHARGE: Performed by: GENETIC COUNSELOR, MS

## 2023-01-23 NOTE — PROGRESS NOTES
Denies loss of fluid and abdominal pain  Not feeling fetal movement  Tolerating prenatal vitamin, probiotics and magnesium well  Is also taking vitamin B6 as needed for nausea  Has appointment with  center today to discuss options for genetic screening  Reviewed MSAFP, patient is agreeable to order today  Patient feel on lower back/buttocks yesterday down 2 steps  Denies any abdominal trauma or pain after fall  Denies other questions or concerns at today's visit   Bp:102/64 weight: -3lb (+5lb since last visit)  Plan  -Continue prenatal vitamins daily  - center appointment today  Level 2 ultrasound scheduled for 23  -MSAFP ordered today  Reviewed is time sensitive test should have drawn prior to 22 gestational weeks  -Common discomforts of pregnancy and precautions reviewed  Signs and symptoms to report reviewed    Written information provided about COVID-19  RTO 4 weeks f/u US

## 2023-01-24 ENCOUNTER — OFFICE VISIT (OUTPATIENT)
Dept: PERINATAL CARE | Facility: OTHER | Age: 33
End: 2023-01-24

## 2023-01-24 DIAGNOSIS — Z33.1 PREGNANT STATE, INCIDENTAL: ICD-10-CM

## 2023-01-24 DIAGNOSIS — Z3A.18 18 WEEKS GESTATION OF PREGNANCY: ICD-10-CM

## 2023-01-24 DIAGNOSIS — Z36.9 UNSPECIFIED ANTENATAL SCREENING: ICD-10-CM

## 2023-01-24 NOTE — PROGRESS NOTES
Patient chose to have [facility] Non-invasive Prenatal Screen with fetal sex  Patient given brochure and is aware [facility] will contact their insurance and coordinate coverage  Patient made aware she will need to respond to text message or e-mail from [employer] within 2 business days or testing will be run through insurance  Patient informed text message will come from area code  "415"  Provided [employer] # [phone] and web site : [email or web address] [email or web address]    "[place] your test online" card with barcode and test tube ID provided to patient  Reviewed [facility]'s web site states 5-7 business days for results via their portal    [employer] message will be sent to patient when MFM receives results /provider reviews  2 vials of blood drawn from LEFT arm by Mario Agudelo  Patient tolerated blood draw without difficulty  Specimens labeled with patient identifiers (name, date of birth, specimen collection date), order and specimen were verified with patient, packed and sent via [facility]  Copy of lab order scanned to Epic media  [facility] will have results in approximately 7-10 business days and will call patient or notify via [address]  Patient aware viewing lab result online will reveal fetal sex if ordered  Patient verbalized understanding of all instructions and no questions at this time

## 2023-01-24 NOTE — PROGRESS NOTES
Genetic Counseling   High-Risk Gestation Note    Appointment Date:  2023  Referred By: Lary Sands MD  YOB: 1990  Partner:  Lucille Nieves  Indication for Visit:  personal and/or family history of genetic disorder:  FOB cousin with thalassemia and FOB cousins with autism, AMA  Pregnancy History:   Estimated Date of Delivery: 23  Estimated Gestational Age: 19w0d      Virtual Regular Visit    Verification of patient location:    Patient is located in the following state in which I hold an active license [place]      Assessment/Plan:    Problem List Items Addressed This Visit    None  Visit Diagnoses     Hereditary disease in family possibly affecting fetus, affecting management of mother in pregnancy, single or unspecified fetus    -  Primary    Primigravida of advanced maternal age in second trimester        Encounter of female for testing for genetic disease carrier status for procreative management        Encounter for procreative genetic counseling                   Reason for visit is   Chief Complaint   Patient presents with   • Virtual Regular Visit        Encounter provider Juany Giang    Provider located at [address] [place] [id number] [phone]      Recent Visits  No visits were found meeting these conditions  Showing recent visits within past 7 days and meeting all other requirements  Future Appointments  No visits were found meeting these conditions  Showing future appointments within next 150 days and meeting all other requirements       The patient was identified by name and date of birth  America Man was informed that this is a telemedicine visit and that the visit is being conducted through the [employer]  She agrees to proceed     My office door was closed  No one else was in the room  She acknowledged consent and understanding of privacy and security of the video platform   The patient has agreed to participate and understands they can discontinue the visit at any time  Bridgette Reyes is a 27 y o  female who presented with her partner for genetic counseling to discuss the above indication  She was not able to have the first trimester/nuchal translucency ultrasound within the correct time frame thus the available genetic testing/screenig options for aneuploidies were reveiwed  We discussed that the risk of Down syndrome at age 28 at delivery is 1/356, and the risk for any chromosomal abnormality at this age is 1/179  The differences between full chromosome aneuploidies and copy number variants (microdeletions and microduplications) was also discussed  We reviewed that copy number variants occur in about 0 4% of all pregnancies  Therefore, for patients under age 39 the risk for copy number variants is higher than the risk for Down syndrome  The risks, benefits, and limitations of amniocentesis were discussed with the patient  Amniocentesis is performed under direct real time ultrasound visualization to avoid both the fetus and the placenta  Once amniotic fluid is withdrawn, laboratory analysis is performed and amniotic fluid alpha-fetoprotein, as well as chromosome and/or microarray analysis is undertaken  The risk of genetic amniocentesis includes, but is not limited to less than 1 in 300 pregnancy loss rate or  delivery rate if 23 weeks or greater, infection, bleeding, rupture of membranes, failure of cells to grow, karyotype error, laboratory error, etc   Occasionally a repeat amniocentesis is necessary due to cell culture failure  Chromosome/microarray analysis from amniocentesis is 99 9% accurate and alpha-fetoprotein analysis can detect approximately 95% of open neural tube defects  We reviewed the testing option of cell free fetal DNA screening (also known as noninvasive prenatal testing or NIPT)    We discussed that it is a serum test to identify fragments of placental DNA in maternal blood  We reviewed the benefits and limitations of cell free fetal DNA screening in detecting Down syndrome, Trisomy 13, Trisomy 25 and sex chromosome aneuploidies  We also discussed that cell free fetal DNA screening does not detect additional chromosomal abnormalities and the possibility of a failed test result  As cell free fetal DNA screening does not detect open neural tube defects, MSAFP screening is available at 15-20 weeks gestation  Quad screening consists of second trimester biochemical analysis of four analytes  It is able to detect approximately 81% of pregnancies in which the fetus has Down syndrome, 75% of pregnancies in which the fetus has trisomy 25 and 85% of pregnancies which the fetus has an open neural tube defect  It can also indirectly identify other chromosomal abnormalities, copy number variants, genetic syndromes, or adverse pregnancy outcomes if serum analyte levels are abnormal     We reviewed that level II anatomy ultrasound is typically performed at approximately 20 weeks gestation  Level II ultrasound evaluation is between 60-80% accurate in detecting major physical birth defects and variations in fetal development that may be associated with chromosome/genetic abnormalities  Level II ultrasound evaluation is not able to detect all birth defects or health problems  After discussing the available prenatal screening and testing options Kanu Acuña elected to pursue cell free fetal DNA screening  She was informed that the results will disclose the fetal sex and will be available in her MyChart to review  An [facility] blood draw was scheduled for 1/24/23 at our [facility] location  Results take approximately 7-10 days  The patient declined amniocentesis secondary to procedural related complications    She may reconsider diagnostic testing should the cell free fetal DNA screening come back abnormal   Kanu Acuña is also planning on pursuing MSAFP screening "and Level II ultrasound at the appropriate times  Histories for the patient and her partner's family were taken during our session  Samir Castellanos reports two male cousins (from a maternal uncle) with an autism spectrum diagnosis, etiology unknown  He does state that one of them might have \"found something\" on genetic testing but did not have any details  We reviewed the general population risk for a diagnosis of autism, which is estimated at approximately 1/  We also reviewed the possible etiologies of autism, including multifactorial and genetic  Autism may be secondary to a chromosomal abnormality or single gene disorder; a genetic cause can now be identified in up to approximately 35-40% of cases  However there are over 800 genes associated with autism that have been identified to date, making a prenatal diagnosis and risk assessment difficult without records on the affected individual   After reviewing the benefits and limitations of Fragile X carrier screening, Cathi Quinonez is considering the screening  She will contact our office if interested  Samir Castellanos also has a male cousin (from a maternal aunt) who has thalassemia  He was not aware of which type - alpha or beta  We reviewed the differences between the two thalassemias as well as the differences between having the trait and the disease  We discussed that if Samir Castellanos and Cathi Quinonez are both carriers of a hemoglobinopathy there is a 25% chance for an affected pregnancy  We reviewed the benefits and limitations of a hemoglobinopathy evaluation which they are considering as part of expanded carrier screening (see below)  Further review of family history for the patient and her partner was noncontributory  The family history was not significant for other genetic diseases or disorders, intellectual disability, birth defects, fetal loss, or consanguinity  Patient reports being of [place] descent and that her partner is of [place] descent    She denies either of " them having known Ashkenazi Zoroastrianism ancestry  The benefits and limitations of Cystic fibrosis (CF), Spinal muscular atrophy (SMA), and expanded carrier screening was discussed  The patient is considering expanded carrier screening and elected to further consider the options (sent via [employer] message)  Should she decide to have any of the blood work performed she will contact our office or her OB office  Lastly, we discussed the fact that everyone in the general population regardless of age, family history, or medical background has approximately a 3-5% risk of having a child with some type of congenital anomaly, genetic disease or intellectual disability  Currently there are no tests available to rule out all birth defects or health problems  Thyra Seip was provided with our contact information  I encouraged her to call with any questions or concerns  Plan/Tests Ordered:  1) Patient declined amniocentesis, and Quad screen  2) Patient elected to pursue NIPT - [facility] blood draw scheduled for 1/24/23 at our [facility] location  3) Patient considering Fragile X and expanded carrier screening - options sent in [employer] message, will contact our office if interested in any of the testing  4) Level II anatomy ultrasound scheduled for 2/7/23  HPI     Past Medical History:   Diagnosis Date   • Varicella        Past Surgical History:   Procedure Laterality Date   • LASIK     • HI CONIZATION CERVIX W/WO D&C RPR ELTRD EXC N/A 4/28/2021    Procedure: BIOPSY LEEP CERVIX;  Surgeon: Beatrice Pickard MD;  Location[facility];  Service: Gynecology       Current Outpatient Medications   Medication Sig Dispense Refill   • magnesium 30 MG tablet Take 30 mg by mouth 2 (two) times a day     • Prenatal 27-1 MG TABS Take 1 tablet by mouth in the morning 30 tablet 2   • saccharomyces boulardii (FLORASTOR) 250 mg capsule Take 250 mg by mouth 2 (two) times a day       No current facility-administered medications for this visit  No Known Allergies    Review of Systems    Video Exam    There were no vitals filed for this visit      Physical Exam     I spent 60 minutes directly with the patient during this visit

## 2023-01-25 LAB
2ND TRIMESTER 4 SCREEN SERPL-IMP: NORMAL
AFP ADJ MOM SERPL: 1.1
AFP INTERP AMN-IMP: NORMAL
AFP INTERP SERPL-IMP: NORMAL
AFP INTERP SERPL-IMP: NORMAL
AFP SERPL-MCNC: 55.1 NG/ML
AGE AT DELIVERY: 35.1 YR
GA METHOD: NORMAL
GA: 18 WEEKS
IDDM PATIENT QL: NO
MULTIPLE PREGNANCY: NO
NEURAL TUBE DEFECT RISK FETUS: 8933 %

## 2023-01-30 ENCOUNTER — TELEPHONE (OUTPATIENT)
Facility: HOSPITAL | Age: 33
End: 2023-01-30

## 2023-01-30 NOTE — TELEPHONE ENCOUNTER
----- Message from Maria Esther Chao MD sent at 2023 12:55 PM EST -----  Hi  RN staff, I've reviewed this NIPS result which shows low residual risk for the conditions tested (trisomies 13, 18, and 21, and sex chromosome abnormality), can you call her to inform her of this result if she has not viewed her result via [employer]? Her OB office is to order the MSAFP  and I sent her a [employer] message as an FYI  Thank you    Maria Esther Chao MD

## 2023-01-30 NOTE — TELEPHONE ENCOUNTER
Left VMM for pt with the results of the NIPS, gender NOT provided  PT already completed MSAFP at time of the call

## 2023-02-06 PROBLEM — Z3A.20 20 WEEKS GESTATION OF PREGNANCY: Status: ACTIVE | Noted: 2022-12-19

## 2023-02-07 ENCOUNTER — ROUTINE PRENATAL (OUTPATIENT)
Dept: OBGYN CLINIC | Facility: MEDICAL CENTER | Age: 33
End: 2023-02-07

## 2023-02-07 ENCOUNTER — ROUTINE PRENATAL (OUTPATIENT)
Dept: PERINATAL CARE | Facility: OTHER | Age: 33
End: 2023-02-07

## 2023-02-07 VITALS — WEIGHT: 138.9 LBS | SYSTOLIC BLOOD PRESSURE: 118 MMHG | BODY MASS INDEX: 23.84 KG/M2 | DIASTOLIC BLOOD PRESSURE: 70 MMHG

## 2023-02-07 VITALS
WEIGHT: 138 LBS | HEIGHT: 64 IN | BODY MASS INDEX: 23.56 KG/M2 | DIASTOLIC BLOOD PRESSURE: 60 MMHG | SYSTOLIC BLOOD PRESSURE: 98 MMHG | HEART RATE: 83 BPM

## 2023-02-07 DIAGNOSIS — N87.1 DYSPLASIA OF CERVIX, HIGH GRADE CIN 2: ICD-10-CM

## 2023-02-07 DIAGNOSIS — Z3A.20 20 WEEKS GESTATION OF PREGNANCY: ICD-10-CM

## 2023-02-07 DIAGNOSIS — Z98.890 HISTORY OF LOOP ELECTROSURGICAL EXCISION PROCEDURE (LEEP) OF CERVIX AFFECTING PREGNANCY IN SECOND TRIMESTER: ICD-10-CM

## 2023-02-07 DIAGNOSIS — Z3A.20 20 WEEKS GESTATION OF PREGNANCY: Primary | ICD-10-CM

## 2023-02-07 DIAGNOSIS — O09.522 SUPERVISION OF ELDERLY MULTIGRAVIDA, SECOND TRIMESTER: Primary | ICD-10-CM

## 2023-02-07 DIAGNOSIS — O34.42 HISTORY OF LOOP ELECTROSURGICAL EXCISION PROCEDURE (LEEP) OF CERVIX AFFECTING PREGNANCY IN SECOND TRIMESTER: ICD-10-CM

## 2023-02-07 NOTE — PROGRESS NOTES
OFFICE CONSULT  Caity Armas, [facility] [place]  [address]  [facility]     Dear Dr Alex Ramirez     Thank you for requesting a  consultation on your patient Simeon Nixon for the following indications: Fetal anatomical survey  She had a normal NIPT and MSAFP in this pregnancy    History  Past medical history: Abnormal Pap smear requiring a LEEP procedure  Past surgical history: 2021 LEEP procedure in the OR, LASIK,   Medications: Magnesium, prenatal vitamins and Saccharomyces Boulardii  Allergies to medications: None  Past obstetrical history:  1 para 0  Social history: Denies substance use  First generation family history: Noncontributory as there is no first generation family history of hypertension, diabetes, thrombosis or congenital anomalies    Ultrasound findings: The ultrasound today shows a fetus that is growing concordant with her dates and no malformations are detected  Amniotic fluid appears normal amount and her cervix appears normal in length  The patient was informed of the findings and counseled about the limitations of the exam in detecting all forms of fetal congenital abnormalities  She does not report any vaginal bleeding or uterine cramping/contractions  She does feel fetal movement  Specific counseling was provided on the following problems:  1  Advanced Maternal Age (AMA) is defined as maternal age 28 or greater at the best estimation of the due date  Advanced maternal age is associated with an increased risk of several pregnancy outcomes, including aneuploidy/genetic syndromes, poor fetal growth, stillbirth, maternal hypertensive disorders, and gestational diabetes  Despite these increased risks, many women of advanced maternal age have normal, healthy pregnancy outcomes, particularly if they have no co-existing medical conditions  Risk of adverse outcomes is proportional to patient age   Recommend a third trimester ultrasound for fetal growth at 28 weeks    2   She reports she has completed the COVID-vaccine in the past   I encouraged both the flu shot and the COVID bivalent booster in pregnancy  3    She had a LEEP that was in 2 pieces and measured 1 9 x 1 3 x 1 2 and 1 8 x 1 6 x 1 3 cm and the tissue was not oriented so is difficult to tell the depth of the tissue removed  Patients with a history of a LEEP may have a higher risk for a late  birth between the gestational ages of 29 to 42 weeks  Follow up recommended:   Recommend a follow-up ultrasound at 32 weeks for growth  Pre visit time reviewing her records  10 minutes  Face to face time 10 minutes  Post visit time on documentation of note, updating her problem list, adding orders and prescriptions 10 minutes  Procedures that were completed today were charged separately  The level of decision making was low level complexity      Dane Garcia MD

## 2023-02-07 NOTE — LETTER
2023     Lyudmila Titus MD  [address]    Patient: Cassia Fernandez   YOB: 1990   Date of Visit: 2023       Dear Dr Yessenia Dominguez:    Thank you for referring Cassia Fernandez to me for evaluation  Below are my notes for this consultation  If you have questions, please do not hesitate to call me  I look forward to following your patient along with you  Sincerely,        Niesha Torres MD        CC: No Recipients  Niesha Torres MD  2023 10:00 AM  Sign when Signing Visit  OFFICE CONSULT  Rayne Donohue     Dear Dr Yessenia Dominguez     Thank you for requesting a  consultation on your patient Cassia Fernandez for the following indications: Fetal anatomical survey  She had a normal NIPT and MSAFP in this pregnancy    History  Past medical history: Abnormal Pap smear requiring a LEEP procedure  Past surgical history: 2021 LEEP procedure in the OR, LASIK,   Medications: Magnesium, prenatal vitamins and Saccharomyces Boulardii  Allergies to medications: None  Past obstetrical history:  1 para 0  Social history: Denies substance use  First generation family history: Noncontributory as there is no first generation family history of hypertension, diabetes, thrombosis or congenital anomalies    Ultrasound findings: The ultrasound today shows a fetus that is growing concordant with her dates and no malformations are detected  Amniotic fluid appears normal amount and her cervix appears normal in length  The patient was informed of the findings and counseled about the limitations of the exam in detecting all forms of fetal congenital abnormalities  She does not report any vaginal bleeding or uterine cramping/contractions  She does feel fetal movement  Specific counseling was provided on the following problems:  1    Advanced Maternal Age (AMA) is defined as maternal age 28 or greater at the best estimation of the due date  Advanced maternal age is associated with an increased risk of several pregnancy outcomes, including aneuploidy/genetic syndromes, poor fetal growth, stillbirth, maternal hypertensive disorders, and gestational diabetes  Despite these increased risks, many women of advanced maternal age have normal, healthy pregnancy outcomes, particularly if they have no co-existing medical conditions  Risk of adverse outcomes is proportional to patient age  Recommend a third trimester ultrasound for fetal growth at 32 weeks  2   She reports she has completed the COVID-vaccine in the past   I encouraged both the flu shot and the COVID bivalent booster in pregnancy  3    She had a LEEP that was in 2 pieces and measured 1 9 x 1 3 x 1 2 and 1 8 x 1 6 x 1 3 cm and the tissue was not oriented so is difficult to tell the depth of the tissue removed  Patients with a history of a LEEP may have a higher risk for a late  birth between the gestational ages of 29 to 42 weeks  Follow up recommended:   Recommend a follow-up ultrasound at 32 weeks for growth  Pre visit time reviewing her records  10 minutes  Face to face time 10 minutes  Post visit time on documentation of note, updating her problem list, adding orders and prescriptions 10 minutes  Procedures that were completed today were charged separately  The level of decision making was low level complexity      Toy Chin MD

## 2023-02-07 NOTE — PROGRESS NOTES
Routine Prenatal Visit  [facility] [address] [place] [address]    Assessment/Plan:  Lisandra Martin is a 27y o  year old  at 24w0d who presents for routine prenatal visit  1  20 weeks gestation of pregnancy  Assessment & Plan:  Level 2 - 23  NIPT - low risk   AFP - negative       2  Dysplasia of cervix, high grade GRAHAM 2  Assessment & Plan:  The last pap was negative and HPV is negative           Subjective:     CC: Prenatal care    Julita Caballero is a 27 y o   female who presents for routine prenatal care at 20w0d  Pregnancy ROS: no leakage of fluid, pelvic pain, or vaginal bleeding  yes fetal movement  The following portions of the patient's history were reviewed and updated as appropriate: allergies, current medications, past family history, past medical history, obstetric history, gynecologic history, past social history, past surgical history and problem list       Objective:  /70   Wt 63 kg (138 lb 14 4 oz)   LMP 2022   BMI 23 84 kg/m²   Pregravid Weight/BMI: 60 8 kg (134 lb) (BMI 22 99)  Current Weight: 63 kg (138 lb 14 4 oz)   Total Weight Gain: 2 223 kg (4 lb 14 4 oz)   Pre- Vitals    Flowsheet Row Most Recent Value   Prenatal Assessment    Fetal Heart Rate 151   Prenatal Vitals    Blood Pressure 118/70   Weight - Scale 63 kg (138 lb 14 4 oz)   Urine Albumin/Glucose    Dilation/Effacement/Station    Vaginal Drainage    Draining Fluid No   Edema    LLE Edema None   RLE Edema None           General: Well appearing, no distress  Respiratory: Unlabored breathing  Cardiovascular: Regular rate  Abdomen: Soft, gravid, nontender  Fundal Height: Appropriate for gestational age  Extremities: Warm and well perfused  Non tender

## 2023-03-02 PROBLEM — Z3A.23 23 WEEKS GESTATION OF PREGNANCY: Status: ACTIVE | Noted: 2022-12-19

## 2023-03-02 NOTE — PROGRESS NOTES
Routine Prenatal Visit  [facility] [address] [place] [address], [place], [address]    Assessment/Plan:  Awilda Juan is a 27y o  year old  at 23w3d who presents for routine prenatal visit  1  23 weeks gestation of pregnancy  Assessment & Plan:  Level 2 - 23 - reviewed - next scan is 23  NIPT - low risk   AFP - negative       28 weeks labs next visit       2  History of loop electrosurgical excision procedure (LEEP) of cervix affecting pregnancy in second trimester  Assessment & Plan:  Cervical length at 20 weeks normal length       3  Supervision of elderly multigravida, second trimester  Assessment & Plan:  Normal testing   Growth in 3rd trimester  Subjective:     CC: Prenatal care    Chelo Mcgowan is a 27 y o   female who presents for routine prenatal care at 23w3d  Pregnancy ROS: no leakage of fluid, pelvic pain, or vaginal bleeding  yes fetal movement  The following portions of the patient's history were reviewed and updated as appropriate: allergies, current medications, past family history, past medical history, obstetric history, gynecologic history, past social history, past surgical history and problem list       Objective:  /70   Wt 64 3 kg (141 lb 11 2 oz)   LMP 2022   BMI 24 32 kg/m²   Pregravid Weight/BMI: 60 8 kg (134 lb) (BMI 22 99)  Current Weight: 64 3 kg (141 lb 11 2 oz)   Total Weight Gain: 3 493 kg (7 lb 11 2 oz)   Pre-Fan Vitals    Flowsheet Row Most Recent Value   Prenatal Assessment    Fetal Heart Rate 161   Movement Present   Prenatal Vitals    Blood Pressure 118/70   Weight - Scale 64 3 kg (141 lb 11 2 oz)   Urine Albumin/Glucose    Dilation/Effacement/Station    Vaginal Drainage    Draining Fluid No   Edema    LLE Edema None   RLE Edema None           General: Well appearing, no distress  Respiratory: Unlabored breathing  Cardiovascular: Regular rate    Abdomen: Soft, gravid, nontender  Fundal Height: Appropriate for gestational age  Extremities: Warm and well perfused  Non tender

## 2023-03-02 NOTE — ASSESSMENT & PLAN NOTE
Level 2 - 2/7/23 - reviewed - next scan is 5/2/23  NIPT - low risk   AFP - negative       28 weeks labs next visit

## 2023-03-06 ENCOUNTER — ROUTINE PRENATAL (OUTPATIENT)
Dept: OBGYN CLINIC | Facility: MEDICAL CENTER | Age: 33
End: 2023-03-06

## 2023-03-06 VITALS — WEIGHT: 141.7 LBS | BODY MASS INDEX: 24.32 KG/M2 | DIASTOLIC BLOOD PRESSURE: 70 MMHG | SYSTOLIC BLOOD PRESSURE: 118 MMHG

## 2023-03-06 DIAGNOSIS — O34.42 HISTORY OF LOOP ELECTROSURGICAL EXCISION PROCEDURE (LEEP) OF CERVIX AFFECTING PREGNANCY IN SECOND TRIMESTER: ICD-10-CM

## 2023-03-06 DIAGNOSIS — Z3A.23 23 WEEKS GESTATION OF PREGNANCY: Primary | ICD-10-CM

## 2023-03-06 DIAGNOSIS — Z98.890 HISTORY OF LOOP ELECTROSURGICAL EXCISION PROCEDURE (LEEP) OF CERVIX AFFECTING PREGNANCY IN SECOND TRIMESTER: ICD-10-CM

## 2023-03-06 DIAGNOSIS — O09.522 SUPERVISION OF ELDERLY MULTIGRAVIDA, SECOND TRIMESTER: ICD-10-CM

## 2023-03-20 ENCOUNTER — OFFICE VISIT (OUTPATIENT)
Dept: FAMILY MEDICINE CLINIC | Facility: CLINIC | Age: 33
End: 2023-03-20

## 2023-03-20 VITALS
BODY MASS INDEX: 25.52 KG/M2 | OXYGEN SATURATION: 99 % | HEIGHT: 63 IN | HEART RATE: 74 BPM | SYSTOLIC BLOOD PRESSURE: 86 MMHG | TEMPERATURE: 98.3 F | DIASTOLIC BLOOD PRESSURE: 56 MMHG | WEIGHT: 144 LBS

## 2023-03-20 DIAGNOSIS — Z00.00 ANNUAL PHYSICAL EXAM: Primary | ICD-10-CM

## 2023-03-20 NOTE — PROGRESS NOTES
ADULT ANNUAL [address] GROUP    NAME: Juan Francisco Arias  AGE: 27 y o  SEX: female  : 1990     DATE: 3/20/2023     Assessment and Plan:     Problem List Items Addressed This Visit    None  Visit Diagnoses     Annual physical exam    -  Primary          Immunizations and preventive care screenings were discussed with patient today  Appropriate education was printed on patient's after visit summary  Counseling:  Alcohol/drug use: discussed moderation in alcohol intake, the recommendations for healthy alcohol use, and avoidance of illicit drug use  Dental Health: discussed importance of regular tooth brushing, flossing, and dental visits  Injury prevention: discussed safety/seat belts, safety helmets, smoke detectors, carbon dioxide detectors, and smoking near bedding or upholstery  Sexual health: discussed sexually transmitted diseases, partner selection, use of condoms, avoidance of unintended pregnancy, and contraceptive alternatives  · Exercise: the importance of regular exercise/physical activity was discussed  Recommend exercise 3-5 times per week for at least 30 minutes  No follow-ups on file  Chief Complaint:     Chief Complaint   Patient presents with   • Physical Exam     PE, no concerns today  Establish care      History of Present Illness:     Adult Annual Physical   Patient here for a comprehensive physical exam  The patient reports no problems  Diet and Physical Activity  · Diet/Nutrition: well balanced diet  · Exercise: walking  Depression Screening  PHQ-2/9 Depression Screening    Little interest or pleasure in doing things: 0 - not at all  Feeling down, depressed, or hopeless: 0 - not at all  PHQ-2 Score: 0  PHQ-2 Interpretation: Negative depression screen       General Health  · Sleep: sleeps well  · Hearing: normal - bilateral   · Vision: no vision problems  · Dental: regular dental visits  /GYN Health  ·  at 26-0 weeks gestation age     Review of Systems:     Review of Systems   Constitutional: Negative for activity change, chills, fatigue and fever  HENT: Negative for congestion, ear pain, sinus pressure and sore throat  Eyes: Negative for redness, itching and visual disturbance  Respiratory: Negative for cough and shortness of breath  Cardiovascular: Negative for chest pain and palpitations  Gastrointestinal: Negative for abdominal pain, diarrhea and nausea  Endocrine: Negative for cold intolerance and heat intolerance  Genitourinary: Negative for dysuria, flank pain and frequency  Musculoskeletal: Negative for arthralgias, back pain, gait problem and myalgias  Skin: Negative for color change  Allergic/Immunologic: Negative for environmental allergies  Neurological: Negative for dizziness, numbness and headaches  Psychiatric/Behavioral: Negative for behavioral problems and sleep disturbance  Past Medical History:     Past Medical History:   Diagnosis Date   • Varicella       Past Surgical History:     Past Surgical History:   Procedure Laterality Date   • LASIK     • RI CONIZATION CERVIX W/WO D&C RPR ELTRD EXC N/A 2021    Procedure: BIOPSY LEEP CERVIX;  Surgeon:  Yesi Caro MD;  Location: [facility];  Service: Gynecology      Social History:     Social History     Socioeconomic History   • Marital status:      Spouse name: None   • Number of children: None   • Years of education: None   • Highest education level: None   Occupational History   • None   Tobacco Use   • Smoking status: Never   • Smokeless tobacco: Never   Vaping Use   • Vaping Use: Never used   Substance and Sexual Activity   • Alcohol use: Not Currently   • Drug use: Never   • Sexual activity: Yes     Partners: Male     Birth control/protection: None   Other Topics Concern   • None   Social History Narrative   • None     Social Determinants of Health     Financial Resource Strain: Low Risk    • Difficulty of Paying Living Expenses: Not hard at all   Food Insecurity: No Food Insecurity   • Worried About Running Out of Food in the Last Year: Never true   • Ran Out of Food in the Last Year: Never true   Transportation Needs: No Transportation Needs   • Lack of Transportation (Medical): No   • Lack of Transportation (Non-Medical): No   Physical Activity: Not on file   Stress: Not on file   Social Connections: Not on file   Intimate Partner Violence: Not on file   Housing Stability: Not on file      Family History:     Family History   Problem Relation Age of Onset   • No Known Problems Mother    • Inguinal hernia Father    • Other Father         Prostate surgery   • Rashes / Skin problems Father    • No Known Problems Sister    • No Known Problems Brother    • Stroke Maternal Grandmother    • Cirrhosis Maternal Grandfather    • Diabetes Paternal Grandmother    • Lymphoma Paternal Grandmother    • Ulcers Paternal Grandmother    • Prostate cancer Paternal Grandfather    • Breast cancer Neg Hx    • Colon cancer Neg Hx    • Ovarian cancer Neg Hx       Current Medications:     Current Outpatient Medications   Medication Sig Dispense Refill   • magnesium 30 MG tablet Take 30 mg by mouth 2 (two) times a day     • Prenatal 27-1 MG TABS Take 1 tablet by mouth in the morning 30 tablet 2   • saccharomyces boulardii (FLORASTOR) 250 mg capsule Take 250 mg by mouth 1x/day       No current facility-administered medications for this visit  Allergies:     No Known Allergies   Physical Exam:     BP (!) 86/56 (BP Location: Left arm, Patient Position: Sitting, Cuff Size: Adult)   Pulse 74   Temp 98 3 °F (36 8 °C)   Ht 5' 3 39" (1 61 m)   Wt 65 3 kg (144 lb)   LMP 09/13/2022   SpO2 99%   BMI 25 20 kg/m²     Physical Exam  Vitals reviewed  Constitutional:       General: She is not in acute distress  Appearance: She is well-developed  She is not diaphoretic     HENT:      Head: Normocephalic and atraumatic  Right Ear: External ear normal       Left Ear: External ear normal       Nose: Nose normal    Eyes:      General:         Right eye: No discharge  Left eye: No discharge  Pupils: Pupils are equal, round, and reactive to light  Cardiovascular:      Rate and Rhythm: Normal rate and regular rhythm  Heart sounds: Normal heart sounds  No murmur heard  No friction rub  No gallop  Pulmonary:      Effort: Pulmonary effort is normal  No respiratory distress  Breath sounds: Normal breath sounds  No wheezing or rales  Abdominal:      General: Bowel sounds are normal  There is no distension  Palpations: Abdomen is soft  Tenderness: There is no abdominal tenderness  There is no guarding  Musculoskeletal:         General: Normal range of motion  Cervical back: Normal range of motion and neck supple  Lymphadenopathy:      Cervical: No cervical adenopathy  Skin:     General: Skin is warm and dry  Capillary Refill: Capillary refill takes less than 2 seconds  Findings: No erythema or rash  Neurological:      Mental Status: She is alert and oriented to person, place, and time  Cranial Nerves: No cranial nerve deficit  Psychiatric:         Behavior: Behavior normal          Thought Content:  Thought content normal          Judgment: Judgment normal           DO SALVADOR Ba [facility]

## 2023-04-02 PROBLEM — Z3A.28 28 WEEKS GESTATION OF PREGNANCY: Status: ACTIVE | Noted: 2022-12-19

## 2023-04-03 ENCOUNTER — ROUTINE PRENATAL (OUTPATIENT)
Dept: OBGYN CLINIC | Facility: MEDICAL CENTER | Age: 33
End: 2023-04-03

## 2023-04-03 VITALS
WEIGHT: 148 LBS | DIASTOLIC BLOOD PRESSURE: 70 MMHG | HEIGHT: 63 IN | SYSTOLIC BLOOD PRESSURE: 110 MMHG | BODY MASS INDEX: 26.22 KG/M2

## 2023-04-03 DIAGNOSIS — O34.42 HISTORY OF LOOP ELECTROSURGICAL EXCISION PROCEDURE (LEEP) OF CERVIX AFFECTING PREGNANCY IN SECOND TRIMESTER: Primary | ICD-10-CM

## 2023-04-03 DIAGNOSIS — O09.522 SUPERVISION OF ELDERLY MULTIGRAVIDA, SECOND TRIMESTER: ICD-10-CM

## 2023-04-03 DIAGNOSIS — Z3A.28 28 WEEKS GESTATION OF PREGNANCY: ICD-10-CM

## 2023-04-03 DIAGNOSIS — Z98.890 HISTORY OF LOOP ELECTROSURGICAL EXCISION PROCEDURE (LEEP) OF CERVIX AFFECTING PREGNANCY IN SECOND TRIMESTER: Primary | ICD-10-CM

## 2023-04-03 PROBLEM — Z01.419 WELL WOMAN EXAM: Status: RESOLVED | Noted: 2022-09-08 | Resolved: 2023-04-03

## 2023-04-03 LAB
BASOPHILS # BLD AUTO: 0.05 THOUSANDS/ÂΜL (ref 0–0.1)
BASOPHILS NFR BLD AUTO: 1 % (ref 0–1)
DME PARACHUTE DELIVERY DATE REQUESTED: NORMAL
DME PARACHUTE ITEM DESCRIPTION: NORMAL
DME PARACHUTE ORDER STATUS: NORMAL
DME PARACHUTE SUPPLIER NAME: NORMAL
DME PARACHUTE SUPPLIER PHONE: NORMAL
EOSINOPHIL # BLD AUTO: 0.25 THOUSAND/ÂΜL (ref 0–0.61)
EOSINOPHIL NFR BLD AUTO: 3 % (ref 0–6)
ERYTHROCYTE [DISTWIDTH] IN BLOOD BY AUTOMATED COUNT: 12.6 % (ref 11.6–15.1)
GLUCOSE 1H P 50 G GLC PO SERPL-MCNC: 69 MG/DL (ref 40–134)
HCT VFR BLD AUTO: 34.7 % (ref 34.8–46.1)
HGB BLD-MCNC: 11.5 G/DL (ref 11.5–15.4)
IMM GRANULOCYTES # BLD AUTO: 0.03 THOUSAND/UL (ref 0–0.2)
IMM GRANULOCYTES NFR BLD AUTO: 0 % (ref 0–2)
LYMPHOCYTES # BLD AUTO: 2.02 THOUSANDS/ÂΜL (ref 0.6–4.47)
LYMPHOCYTES NFR BLD AUTO: 26 % (ref 14–44)
MCH RBC QN AUTO: 31 PG (ref 26.8–34.3)
MCHC RBC AUTO-ENTMCNC: 33.1 G/DL (ref 31.4–37.4)
MCV RBC AUTO: 94 FL (ref 82–98)
MONOCYTES # BLD AUTO: 0.53 THOUSAND/ÂΜL (ref 0.17–1.22)
MONOCYTES NFR BLD AUTO: 7 % (ref 4–12)
NEUTROPHILS # BLD AUTO: 4.92 THOUSANDS/ÂΜL (ref 1.85–7.62)
NEUTS SEG NFR BLD AUTO: 63 % (ref 43–75)
NRBC BLD AUTO-RTO: 0 /100 WBCS
PLATELET # BLD AUTO: 193 THOUSANDS/UL (ref 149–390)
PMV BLD AUTO: 11 FL (ref 8.9–12.7)
RBC # BLD AUTO: 3.71 MILLION/UL (ref 3.81–5.12)
WBC # BLD AUTO: 7.8 THOUSAND/UL (ref 4.31–10.16)

## 2023-04-03 NOTE — PROGRESS NOTES
Denies loss of fluid, vaginal bleeding and abdominal pain  Confirms frequent fetal movement  Tolerating prenatal vitamin, magnesium and probiotic well  Reviewed recommendation for Tdap, patient is agreeable to administration at today's visit  Has had a few bloody noses  Denies other questions or concerns at today's visit  Patient plans include breast-feeding (pump ordered today), open to epidural as needed for pain during labor, Mirena IUD for postpartum contraception  Remains uncertain regarding pediatrician or postpartum contraception  BP: 110/70 weight: +14lbs  Plan  -Continue prenatal vitamins daily  -Fetal kick counts reviewed, encouraged daily and written information provided  -28-week labs collected  28-week folder provided and reviewed  Breast pump ordered  List of classes provided  [facility] for assistance with choosing pediatrician  -Tdap vaccine today  -Follow-up with  center scheduled for 23  -Common discomforts of pregnancy and precautions including  labor reviewed    Written information provided about COVID-19  RTO 2 weeks f/u labs

## 2023-04-03 NOTE — ASSESSMENT & PLAN NOTE
NIPT/AFP low risk     - Birth plan given, peds list given, birth consent signed  - 28 wk labs drawn  - Tdap to be given, recommendations reviewed for family vaccination  - Rhogam not indicated    Next scan = may

## 2023-04-18 ENCOUNTER — ROUTINE PRENATAL (OUTPATIENT)
Dept: OBGYN CLINIC | Facility: MEDICAL CENTER | Age: 33
End: 2023-04-18

## 2023-04-18 VITALS — SYSTOLIC BLOOD PRESSURE: 100 MMHG | WEIGHT: 151.5 LBS | DIASTOLIC BLOOD PRESSURE: 60 MMHG | BODY MASS INDEX: 26.84 KG/M2

## 2023-04-18 DIAGNOSIS — O09.513 AMA (ADVANCED MATERNAL AGE) PRIMIGRAVIDA 35+, THIRD TRIMESTER: ICD-10-CM

## 2023-04-18 DIAGNOSIS — Z3A.30 30 WEEKS GESTATION OF PREGNANCY: ICD-10-CM

## 2023-04-18 DIAGNOSIS — Z34.93 THIRD TRIMESTER PREGNANCY: Primary | ICD-10-CM

## 2023-04-18 NOTE — PROGRESS NOTES
Assessment  27 y o  Brown Sensor at 30w1d presenting for routine prenatal visit  Plan  Diagnoses and all orders for this visit:    Third trimester pregnancy  30 weeks gestation of pregnancy  - PTL precautions  - [facility]KC  - Return in 2wks for PN    AMA, primigravida  - Normal NIPT/AFP  - Follows with MFM      ____________________________________________________________        Subjective    Greg Hoffman is a 27 y o  Gray S[place] at 30w1d who presents for routine prenatal visit  She notes a little light bleeding 1wk prior  Had sex the day before  Bleeding was light and just a spot or 2 on a pad  No cramping, discharge, or changes with FM during that time  Resolved for several days now  Denies contractions, loss of fluid, or vaginal bleeding  She feels regular fetal movements  Pregnancy Problems:  Patient Active Problem List   Diagnosis    Dysplasia of cervix, high grade GRAHAM 2    S/P LEEP (loop electrosurgical excision procedure)    History of loop electrosurgical excision procedure (LEEP) of cervix affecting pregnancy in second trimester    30 weeks gestation of pregnancy    AMA (advanced maternal age) primigravida 35+, third trimester         Objective  /60   Wt 68 7 kg (151 lb 8 oz)   LMP 09/13/2022   BMI 26 84 kg/m²     FHT: 150 BPM   Uterine Size: 29 cm     Physical Exam:  Physical Exam  Constitutional:       General: She is not in acute distress  Appearance: Normal appearance  She is well-developed  She is not ill-appearing, toxic-appearing or diaphoretic  HENT:      Head: Normocephalic and atraumatic  Eyes:      General: No scleral icterus  Right eye: No discharge  Left eye: No discharge  Conjunctiva/sclera: Conjunctivae normal    Pulmonary:      Effort: Pulmonary effort is normal  No accessory muscle usage or respiratory distress  Abdominal:      General: There is distension (gravid)  Tenderness: There is no abdominal tenderness  There is no guarding or rebound  Skin:     General: Skin is warm and dry  Coloration: Skin is not jaundiced  Findings: No bruising, erythema or rash  Neurological:      Mental Status: She is alert  Psychiatric:         Mood and Affect: Mood normal          Behavior: Behavior normal          Thought Content:  Thought content normal          Judgment: Judgment normal

## 2023-05-01 ENCOUNTER — ROUTINE PRENATAL (OUTPATIENT)
Dept: OBGYN CLINIC | Facility: MEDICAL CENTER | Age: 33
End: 2023-05-01

## 2023-05-01 VITALS — BODY MASS INDEX: 26.8 KG/M2 | SYSTOLIC BLOOD PRESSURE: 100 MMHG | DIASTOLIC BLOOD PRESSURE: 70 MMHG | WEIGHT: 151.3 LBS

## 2023-05-01 DIAGNOSIS — Z3A.32 32 WEEKS GESTATION OF PREGNANCY: Primary | ICD-10-CM

## 2023-05-01 DIAGNOSIS — O09.513 AMA (ADVANCED MATERNAL AGE) PRIMIGRAVIDA 35+, THIRD TRIMESTER: ICD-10-CM

## 2023-05-01 LAB
DME PARACHUTE DELIVERY DATE ACTUAL: NORMAL
DME PARACHUTE DELIVERY DATE REQUESTED: NORMAL
DME PARACHUTE ITEM DESCRIPTION: NORMAL
DME PARACHUTE ORDER STATUS: NORMAL
DME PARACHUTE SUPPLIER NAME: NORMAL
DME PARACHUTE SUPPLIER PHONE: NORMAL

## 2023-05-01 NOTE — PROGRESS NOTES
Routine Prenatal Visit  [facility] [address] [place] [address]    Assessment/Plan:  Babita Hand is a 27y o  year old  at 32w0d who presents for routine prenatal visit  1  32 weeks gestation of pregnancy  Assessment & Plan:  GTT - normal  Lab Results   Component Value Date    WBC 7 80 2023    HGB 11 5 2023    HCT 34 7 (L) 2023    MCV 94 2023     2023           2  AMA (advanced maternal age) primigravida 33+, third trimester  Assessment & Plan:  Normal testing   Growth in 3rd trimester                Subjective:     CC: Prenatal care    Dain Vernon is a 27 y o   female who presents for routine prenatal care at 32w0d  Pregnancy ROS: no leakage of fluid, pelvic pain, or vaginal bleeding  yes fetal movement  The following portions of the patient's history were reviewed and updated as appropriate: allergies, current medications, past family history, past medical history, obstetric history, gynecologic history, past social history, past surgical history and problem list       Objective:  /70   Wt 68 6 kg (151 lb 4 8 oz)   LMP 2022   BMI 26 80 kg/m²   Pregravid Weight/BMI: 60 8 kg (134 lb) (BMI 23 74)  Current Weight: 68 6 kg (151 lb 4 8 oz)   Total Weight Gain: 7 847 kg (17 lb 4 8 oz)   Pre-Fan Vitals    Flowsheet Row Most Recent Value   Prenatal Assessment    Fetal Heart Rate 140   Movement Present   Prenatal Vitals    Blood Pressure 100/70   Weight - Scale 68 6 kg (151 lb 4 8 oz)   Urine Albumin/Glucose    Dilation/Effacement/Station    Vaginal Drainage    Draining Fluid No   Edema    LLE Edema +3   RLE Edema +3           General: Well appearing, no distress  Respiratory: Unlabored breathing  Cardiovascular: Regular rate  Abdomen: Soft, gravid, nontender  Fundal Height: Appropriate for gestational age  Extremities: Warm and well perfused  Non tender

## 2023-05-01 NOTE — ASSESSMENT & PLAN NOTE
GTT - normal  Lab Results   Component Value Date    WBC 7 80 04/03/2023    HGB 11 5 04/03/2023    HCT 34 7 (L) 04/03/2023    MCV 94 04/03/2023     04/03/2023

## 2023-06-22 NOTE — PROGRESS NOTES
I supervised the genetic counselor  I was present in the office and reviewed and agree with the note    Adalid Luna MD 06/22/23

## 2023-09-12 NOTE — PROGRESS NOTES
A/P    1. Annual exam    Last PAP - 9/8/22- neg neg   Next due 2027   Scheduling of pap discussed in detail     2. Contraception    She is considering another baby will recommend not the IUD since might try in 1 years time      26 y.o.,No LMP recorded. C/O no gyn issues. Past medical / social / surgical / family history reviewed and updated   Medication and allergies discussed in detail and updated     Review of Systems - History obtained from chart review and the patient  General ROS: negative  Psychological ROS: negative  Ophthalmic ROS: negative  ENT ROS: negative  Allergy and Immunology ROS: negative  Hematological and Lymphatic ROS: negative  Endocrine ROS: negative  Breast ROS: negative for breast lumps  Respiratory ROS: no cough, shortness of breath, or wheezing  Cardiovascular ROS: no chest pain or dyspnea on exertion  Gastrointestinal ROS: no abdominal pain, change in bowel habits, or black or bloody stools  Genito-Urinary ROS: no dysuria, trouble voiding, or hematuria  Musculoskeletal ROS: negative  Neurological ROS: no TIA or stroke symptoms  Dermatological ROS: negative    Ht 5' 3" (1.6 m)   Wt 64 kg (141 lb)   Breastfeeding Yes   BMI 24.98 kg/m²           Physical Exam  Vitals reviewed. Constitutional:       Appearance: She is well-developed. Neck:      Thyroid: No thyromegaly. Cardiovascular:      Rate and Rhythm: Normal rate. Heart sounds: Normal heart sounds. Pulmonary:      Effort: Pulmonary effort is normal. No accessory muscle usage or respiratory distress. Chest:      Chest wall: No tenderness. Breasts:     Breasts are symmetrical.      Right: No inverted nipple, mass or tenderness. Left: No inverted nipple, mass or tenderness. Abdominal:      General: There is no distension. Palpations: Abdomen is soft. Tenderness: There is no abdominal tenderness. There is no guarding or rebound. Genitourinary:     Exam position: Supine.       Labia:         Right: No rash, tenderness, lesion or injury. Left: No rash, tenderness, lesion or injury. Vagina: Normal. No foreign body. No vaginal discharge or bleeding. Cervix: No cervical motion tenderness or discharge. Uterus: Not enlarged and not fixed. Adnexa:         Right: No mass, tenderness or fullness. Left: No mass, tenderness or fullness. Rectum: No external hemorrhoid. Musculoskeletal:      Cervical back: Normal range of motion. Lymphadenopathy:      Cervical: No cervical adenopathy. Upper Body:      Right upper body: No supraclavicular adenopathy. Left upper body: No supraclavicular adenopathy. Neurological:      Mental Status: She is alert and oriented to person, place, and time. Psychiatric:         Speech: Speech normal.         Behavior: Behavior normal.         Thought Content:  Thought content normal.         Judgment: Judgment normal.

## 2023-09-13 ENCOUNTER — ANNUAL EXAM (OUTPATIENT)
Dept: OBGYN CLINIC | Facility: MEDICAL CENTER | Age: 33
End: 2023-09-13
Payer: COMMERCIAL

## 2023-09-13 VITALS — BODY MASS INDEX: 24.98 KG/M2 | HEIGHT: 63 IN | WEIGHT: 141 LBS

## 2023-09-13 DIAGNOSIS — Z01.419 WOMEN'S ANNUAL ROUTINE GYNECOLOGICAL EXAMINATION: Primary | ICD-10-CM

## 2023-09-13 PROCEDURE — S0612 ANNUAL GYNECOLOGICAL EXAMINA: HCPCS | Performed by: OBSTETRICS & GYNECOLOGY

## 2024-02-20 PROBLEM — O34.42 HISTORY OF LOOP ELECTROSURGICAL EXCISION PROCEDURE (LEEP) OF CERVIX AFFECTING PREGNANCY IN SECOND TRIMESTER: Status: RESOLVED | Noted: 2022-12-19 | Resolved: 2024-02-20

## 2024-02-20 PROBLEM — N87.1 DYSPLASIA OF CERVIX, HIGH GRADE CIN 2: Status: RESOLVED | Noted: 2021-04-08 | Resolved: 2024-02-20

## 2024-02-20 PROBLEM — Z3A.32 32 WEEKS GESTATION OF PREGNANCY: Status: RESOLVED | Noted: 2022-12-19 | Resolved: 2024-02-20

## 2024-02-20 PROBLEM — Z98.890 HISTORY OF LOOP ELECTROSURGICAL EXCISION PROCEDURE (LEEP) OF CERVIX AFFECTING PREGNANCY IN SECOND TRIMESTER: Status: RESOLVED | Noted: 2022-12-19 | Resolved: 2024-02-20

## 2024-02-20 PROBLEM — O09.513 AMA (ADVANCED MATERNAL AGE) PRIMIGRAVIDA 35+, THIRD TRIMESTER: Status: RESOLVED | Noted: 2023-02-07 | Resolved: 2024-02-20

## 2024-02-20 NOTE — PROGRESS NOTES
Options for birth control with the risk and benefits discussed in detail     1.  Combination medications    Pill / patch / ring     Regular they cycle, stop pregnancy but not protect against std    The risk of nausea and vomiting     Risk of blood clot discussed     2. Depo-provera   Good compliance since q 12 weeks   But could cause irregular bleeding weight gain and hair loss   Irreversible bone loss and cant use greater then 3 years.     3. IUD    discussed hormonal and non hormonal    Risk of irregular bleeding    Infection increase if not in a monogamous relationship and painful insertion     4.  Nexplanon   Discussed that it is progesterone    Advised that the cycles will be irregular for a few months   Advised that it is for 3 years   Does not cause bone loss like the depo provera or decline in estrogen    No protection for STD         Last we spoke was considering another baby   She wants a birth control   She will consider another baby in about 1 year  She is still breastfeeding   Will start Micronor - went over taking daily same time

## 2024-02-21 ENCOUNTER — OFFICE VISIT (OUTPATIENT)
Dept: OBGYN CLINIC | Facility: MEDICAL CENTER | Age: 34
End: 2024-02-21
Payer: COMMERCIAL

## 2024-02-21 VITALS — BODY MASS INDEX: 25.29 KG/M2 | HEIGHT: 63 IN | WEIGHT: 142.7 LBS

## 2024-02-21 DIAGNOSIS — Z30.011 ENCOUNTER FOR INITIAL PRESCRIPTION OF CONTRACEPTIVE PILLS: Primary | ICD-10-CM

## 2024-02-21 PROCEDURE — 99214 OFFICE O/P EST MOD 30 MIN: CPT | Performed by: OBSTETRICS & GYNECOLOGY

## 2024-02-21 RX ORDER — ACETAMINOPHEN AND CODEINE PHOSPHATE 120; 12 MG/5ML; MG/5ML
1 SOLUTION ORAL DAILY
Qty: 90 TABLET | Refills: 3 | Status: SHIPPED | OUTPATIENT
Start: 2024-02-21

## 2024-05-15 DIAGNOSIS — Z30.011 ENCOUNTER FOR INITIAL PRESCRIPTION OF CONTRACEPTIVE PILLS: ICD-10-CM

## 2024-05-15 RX ORDER — ACETAMINOPHEN AND CODEINE PHOSPHATE 120; 12 MG/5ML; MG/5ML
1 SOLUTION ORAL DAILY
Qty: 90 TABLET | Refills: 1 | Status: SHIPPED | OUTPATIENT
Start: 2024-05-15

## 2024-07-12 DIAGNOSIS — Z30.011 ENCOUNTER FOR INITIAL PRESCRIPTION OF CONTRACEPTIVE PILLS: ICD-10-CM

## 2024-07-12 RX ORDER — ACETAMINOPHEN AND CODEINE PHOSPHATE 120; 12 MG/5ML; MG/5ML
1 SOLUTION ORAL DAILY
Qty: 100 TABLET | Refills: 1 | Status: SHIPPED | OUTPATIENT
Start: 2024-07-12

## 2024-08-21 DIAGNOSIS — Z00.6 ENCOUNTER FOR EXAMINATION FOR NORMAL COMPARISON OR CONTROL IN CLINICAL RESEARCH PROGRAM: ICD-10-CM

## 2024-09-23 ENCOUNTER — ANNUAL EXAM (OUTPATIENT)
Dept: OBGYN CLINIC | Facility: MEDICAL CENTER | Age: 34
End: 2024-09-23
Payer: COMMERCIAL

## 2024-09-23 ENCOUNTER — APPOINTMENT (OUTPATIENT)
Dept: LAB | Facility: MEDICAL CENTER | Age: 34
End: 2024-09-23
Payer: COMMERCIAL

## 2024-09-23 VITALS — HEIGHT: 63 IN | WEIGHT: 139.5 LBS | BODY MASS INDEX: 24.72 KG/M2

## 2024-09-23 DIAGNOSIS — Z12.4 SCREENING FOR CERVICAL CANCER: ICD-10-CM

## 2024-09-23 DIAGNOSIS — Z30.011 ENCOUNTER FOR INITIAL PRESCRIPTION OF CONTRACEPTIVE PILLS: ICD-10-CM

## 2024-09-23 DIAGNOSIS — Z01.419 ENCOUNTER FOR GYNECOLOGICAL EXAMINATION (GENERAL) (ROUTINE) WITHOUT ABNORMAL FINDINGS: Primary | ICD-10-CM

## 2024-09-23 DIAGNOSIS — Z00.6 ENCOUNTER FOR EXAMINATION FOR NORMAL COMPARISON OR CONTROL IN CLINICAL RESEARCH PROGRAM: ICD-10-CM

## 2024-09-23 PROCEDURE — G0476 HPV COMBO ASSAY CA SCREEN: HCPCS | Performed by: OBSTETRICS & GYNECOLOGY

## 2024-09-23 PROCEDURE — G0145 SCR C/V CYTO,THINLAYER,RESCR: HCPCS | Performed by: OBSTETRICS & GYNECOLOGY

## 2024-09-23 PROCEDURE — 36415 COLL VENOUS BLD VENIPUNCTURE: CPT

## 2024-09-23 PROCEDURE — 99395 PREV VISIT EST AGE 18-39: CPT | Performed by: OBSTETRICS & GYNECOLOGY

## 2024-09-23 RX ORDER — ACETAMINOPHEN AND CODEINE PHOSPHATE 120; 12 MG/5ML; MG/5ML
1 SOLUTION ORAL DAILY
Qty: 84 TABLET | Refills: 3 | Status: SHIPPED | OUTPATIENT
Start: 2024-09-23

## 2024-09-23 RX ORDER — PREDNISONE 20 MG/1
20 TABLET ORAL DAILY
COMMUNITY
Start: 2024-09-04

## 2024-09-23 RX ORDER — BENZONATATE 200 MG/1
CAPSULE ORAL
COMMUNITY
Start: 2024-09-04

## 2024-09-23 NOTE — PROGRESS NOTES
ASSESSMENT & PLAN: Amy was seen today for gynecologic exam.    Diagnoses and all orders for this visit:    Encounter for gynecological examination (general) (routine) without abnormal findings    Screening for cervical cancer  -     Liquid-based pap, screening    Encounter for initial prescription of contraceptive pills  -     norethindrone (MICRONOR) 0.35 MG tablet; Take 1 tablet (0.35 mg total) by mouth daily          1.  Routine well woman exam done today  2.  Pap and HPV:  Patient's pap is not current.  Pap and cotesting was done today.  Current ASCCP Guidelines reviewed.   3.  Patient has had her Gardasil vaccination.  Recommendations reviewed.  4.  The following were reviewed in today's visit: adequate intake of calcium and vitamin D, exercise, and healthy diet.  5.  F/u in 1 year for next routine GYN exam   6.  Refill of current birth control pill sent to patient's pharmacy.  Whenever patient is ready to wean from nursing, reviewed other contraceptive options.  Patient may be interested in the NuvaRing.    CC:  Annual Gynecologic Examination    HPI: Amy Leach is a 34 y.o.  who presents for annual gynecologic examination.  She has the following concerns:  pt is taking POP's.  Had missed a few pills.  Has questions.  Patient is currently nursing.    Health Maintenance:    Patient reports her health to be good.  She does not have weight concerns.  She exercises with walking.    She does wear her seatbelt routinely.    She does perform regular monthly self breast exams.    She does feels safe at home.   Patients does not follow a special diet/healthy.  She gets 1 servings of dairy or calcium rich foods a day.    Patient Active Problem List   Diagnosis    S/P LEEP (loop electrosurgical excision procedure)       Past Medical History:   Diagnosis Date    Abnormal Pap smear of cervix     Varicella        Past Surgical History:   Procedure Laterality Date    LASIK      IL CONIZATION CERVIX W/WO D&C RPR [facility]  EXC N/A 4/28/2021    Procedure: BIOPSY LEEP CERVIX;  Surgeon: Nii Stewart MD;  Location: [facility];  Service: Gynecology       Past OB/Gyn History:  Pt does not have menstrual issues.      History of sexually transmitted infection: No.  History of abnormal pap smears: Yes.  Last pap 2022; LEEP 2021  Patient is currently sexually active.  heterosexual.   The current method of family planning is oral progesterone-only contraceptive.    Family History   Problem Relation Age of Onset    No Known Problems Mother     Inguinal hernia Father     Other Father         Prostate surgery    Rashes / Skin problems Father     No Known Problems Sister     No Known Problems Brother     Stroke Maternal Grandmother     Cirrhosis Maternal Grandfather     Diabetes Paternal Grandmother     Lymphoma Paternal Grandmother     Ulcers Paternal Grandmother     Cancer Paternal Grandmother     Prostate cancer Paternal Grandfather     Cancer Paternal Grandfather         Prostatic    Breast cancer Neg Hx     Colon cancer Neg Hx     Ovarian cancer Neg Hx        Social History:  Social History     Socioeconomic History    Marital status:      Spouse name: Not on file    Number of children: Not on file    Years of education: Not on file    Highest education level: Not on file   Occupational History    Not on file   Tobacco Use    Smoking status: Never    Smokeless tobacco: Never   Vaping Use    Vaping status: Never Used   Substance and Sexual Activity    Alcohol use: Not Currently    Drug use: Never    Sexual activity: Yes     Partners: Male     Birth control/protection: None   Other Topics Concern    Not on file   Social History Narrative    Not on file     Social Determinants of Health     Financial Resource Strain: Low Risk  (9/8/2022)    Overall Financial Resource Strain (CARDIA)     Difficulty of Paying Living Expenses: Not hard at all   Food Insecurity: No Food Insecurity (9/8/2022)    Hunger Vital Sign     Worried About Running Out  of Food in the Last Year: Never true     Ran Out of Food in the Last Year: Never true   Transportation Needs: No Transportation Needs (9/8/2022)    PRAPARE - Transportation     Lack of Transportation (Medical): No     Lack of Transportation (Non-Medical): No   Physical Activity: Insufficiently Active (9/7/2021)    Exercise Vital Sign     Days of Exercise per Week: 3 days     Minutes of Exercise per Session: 30 min   Stress: No Stress Concern Present (9/7/2021)    [place] [employer] - Occupational Stress Questionnaire     Feeling of Stress : Only a little   Social Connections: Moderately Isolated (9/7/2021)    Social Connection and Isolation Panel [NHANES]     Frequency of Communication with Friends and Family: More than three times a week     Frequency of Social Gatherings with Friends and Family: Never     Attends Worship Services: Never     Active Member of Clubs or Organizations: No     Attends Club or Organization Meetings: Never     Marital Status: Living with partner   Intimate Partner Violence: Not At Risk (9/7/2021)    Humiliation, Afraid, Rape, and Kick questionnaire     Fear of Current or Ex-Partner: No     Emotionally Abused: No     Physically Abused: No     Sexually Abused: No   Housing Stability: Low Risk  (9/7/2021)    Housing Stability Vital Sign     Unable to Pay for Housing in the Last Year: No     Number of Places Lived in the Last Year: 1     Unstable Housing in the Last Year: No     Presently lives with fiance and child.  Patient is currently employed .  No Known Allergies    Current Outpatient Medications:     benzonatate (TESSALON) 200 MG capsule, TAKE 1 CAPSULE BY MOUTH 2 TO 3 TIMES PER DAY AS NEEDED FOR COUGH, Disp: , Rfl:     magnesium 30 MG tablet, Take 30 mg by mouth 2 (two) times a day, Disp: , Rfl:     norethindrone (MICRONOR) 0.35 MG tablet, Take 1 tablet (0.35 mg total) by mouth daily, Disp: 84 tablet, Rfl: 3    predniSONE 20 mg tablet, Take 20  "mg by mouth daily, Disp: , Rfl:     Prenatal 27-1 MG TABS, Take 1 tablet by mouth in the morning, Disp: 30 tablet, Rfl: 2    saccharomyces boulardii (FLORASTOR) 250 mg capsule, Take 250 mg by mouth 1x/day, Disp: , Rfl:       Review of Systems  Constitutional :no fever, feels well, no tiredness, no recent weight gain or loss  ENT: no ear ache, no loss of hearing, no nosebleeds or nasal discharge, no sore throat or hoarseness.  Cardiovascular: no complaints of slow or fast heart beat, no chest pain, no palpitations, no leg claudication or lower extremity edema.  Respiratory: no complaints of shortness of shortness of breath, no STOCKTON  Breasts:no complaints of breast pain, breast lump, or nipple discharge  Gastrointestinal: no complaints of abdominal pain, constipation, nausea, vomiting, or diarrhea or bloody stools  Genitourinary : no complaints of dysuria, incontinence, pelvic pain, no dysmenorrhea, vaginal discharge or abnormal vaginal bleeding and as noted in HPI.  Musculoskeletal: no complaints of arthralgia, no myalgia, no joint swelling or stiffness, no limb pain or swelling.  Integumentary: no complaints of skin rash or lesion, itching or dry skin  Neurological: no complaints of headache, no confusion, no numbness or tingling, no dizziness or fainting    Physical Exam:   Ht 5' 3\" (1.6 m)   Wt 63.3 kg (139 lb 8 oz)   BMI 24.71 kg/m²     General: appears stated age, cooperative, alert normal mood and affect   Psychiatric oriented to person, place and time.  Mood and affect normal   Neck: normal, supple,trachea midline, no masses.  Thyroid: normal, no thyromegaly   Heart: regular rate and rhythm, S1, S2 normal, no murmur, click, rub or gallop   Lungs: clear to auscultation bilaterally, no increased work of breathing or signs of respiratory distress   Breasts: normal, no dimpling or skin changes noted, moderate fibrocystic changes in upper outer quadrants bilaterally   Abdomen: soft, non-tender, without masses or " organomegaly   Vulva: normal , no lesions   Vagina: normal , no lesions or dryness   Urethra: normal   Urethal meatus normal   Bladder Normal, soft, non-tender and no prolapse or masses appreciated   Cervix: normal, no palpable masses    Uterus: normal , non-tender, not enlarged, no palpable masses   Adnexa: normal, non-tender without fullness or masses   Lymphatic Palpation of lymph nodes in neck, axilla, groin and/or other locations: no lymphadenopathy or masses noted   Skin Normal skin turgor and no rashes.  Palpation of skin and subcutaneous tissue normal.

## 2024-09-26 LAB
LAB AP GYN PRIMARY INTERPRETATION: NORMAL
Lab: NORMAL

## 2024-10-04 LAB
APOB+LDLR+PCSK9 GENE MUT ANL BLD/T: NOT DETECTED
BRCA1+BRCA2 DEL+DUP + FULL MUT ANL BLD/T: NOT DETECTED
MLH1+MSH2+MSH6+PMS2 GN DEL+DUP+FUL M: NOT DETECTED

## 2024-12-20 ENCOUNTER — TELEPHONE (OUTPATIENT)
Dept: FAMILY MEDICINE CLINIC | Facility: CLINIC | Age: 34
End: 2024-12-20

## 2025-03-19 ENCOUNTER — OFFICE VISIT (OUTPATIENT)
Dept: OBGYN CLINIC | Facility: MEDICAL CENTER | Age: 35
End: 2025-03-19
Payer: COMMERCIAL

## 2025-03-19 VITALS
DIASTOLIC BLOOD PRESSURE: 60 MMHG | SYSTOLIC BLOOD PRESSURE: 102 MMHG | HEIGHT: 63 IN | BODY MASS INDEX: 25.05 KG/M2 | WEIGHT: 141.4 LBS

## 2025-03-19 DIAGNOSIS — N83.202 LEFT OVARIAN CYST: Primary | ICD-10-CM

## 2025-03-19 DIAGNOSIS — Z30.014 ENCOUNTER FOR INITIAL PRESCRIPTION OF INTRAUTERINE CONTRACEPTIVE DEVICE (IUD): ICD-10-CM

## 2025-03-19 PROCEDURE — 99214 OFFICE O/P EST MOD 30 MIN: CPT | Performed by: OBSTETRICS & GYNECOLOGY

## 2025-03-19 NOTE — PATIENT INSTRUCTIONS
"Patient Education     Intrauterine devices (IUDs)   The Basics   Written by the doctors and editors at [facility]   What is an intrauterine device? -- An intrauterine device (\"IUD\") is a type of birth control. It is a small, T-shaped device. A doctor or nurse puts an IUD in your uterus by going through your vagina and cervix (figure 1).  IUDs are made of flexible plastic and have 2 thin plastic strings that hang out of the cervix. They are very small, a little more than 1 inch (2.5 cm) in width and length.  An IUD is one of the safest, most effective methods for preventing pregnancy. It is a good choice for people, including teens, who do not want to get pregnant for at least 1 year. An IUD can also be used to prevent pregnancy if it is put in within 5 days after you have unprotected sex. This is known as \"emergency contraception.\"  You can also use IUDs for reasons other than birth control. For example, 1 type of IUD can be used to treat heavy, painful periods.  What are the different types of IUDs? -- There are 2 categories of IUDs available in the . One type contains copper. The other type contains a hormone called \"levonorgestrel\" (figure 2):   Copper IUD - There is only 1 copper-containing IUD (brand name: Paragard). It can stay in your uterus for 10 years, or longer for some people, to prevent pregnancy. Some people who use it get heavier or longer periods than they had before getting the IUD.   Hormonal IUDs - There are 4 hormone-containing IUDs (brand names: Mirena, Liletta, Kyleena, Anita) (figure 2). Depending on which of these you have, it can stay in your uterus for up to 8, 5, or 3 years. Many people who use hormonal IUDs have lighter, less painful periods than they had before getting the IUD. Some people stop getting a period at all, but this is not harmful. After having the IUD removed, periods usually return to normal within a month or 2.  Other types of IUDs are also available outside of the " "[place].  What are the benefits of using an IUD? -- The benefits of using an IUD include:   IUDs are very effective. Fewer than 1 in 100 people who use an IUD get pregnant during the first year of use.   You do not have to remember to do anything or take any birth control medicines on a regular basis.   IUDs have few side effects.   IUDs do not contain estrogen, a hormone that some people can't or don't want to take.   If you decide you want to get pregnant, you can have the IUD taken out.   If you use an IUD for several years, it costs less overall than many other types of birth control. That's because there are no costs after you have it inserted.   There is evidence that using an IUD lowers your risk of getting cervical cancer.  What are the downsides of using an IUD? -- The downsides of using an IUD include:   Unlike condoms, an IUD does not protect you against infections that you can catch during sex. These are called \"sexually transmitted infections\" (\"STIs\") or \"sexually transmitted diseases.\" But you and your partner(s) can use condoms to prevent spreading infections.   There is a small chance that the IUD will come out during your period. If this happens, you will need to get a new IUD. If you see your IUD in your underwear, on your pad, or in the toilet, call your doctor or nurse.   The initial cost is higher than the cost of other methods. But, there are no more costs after it is inserted.   Only a doctor or nurse can insert or remove an IUD.  You should not get an IUD if you recently had an infection that spread to your uterus and other nearby organs. This is called a \"pelvic infection.\" STIs such as chlamydia and gonorrhea can cause pelvic infections.  Which type of IUD is best for me? -- Your nurse or doctor can talk to you about the options and help you choose the right IUD for you.  A copper IUD might be a good choice if you:   Want or need to avoid hormones   Want to avoid big changes in your period, " "such as not having any periods or bleeding or spotting between periods   Want birth control for up to 10 years, or possibly longer  A hormonal IUD might be a good choice if you:   Have heavy, painful periods. These IUDs can make your periods lighter and less painful.   Have pelvic pain from a condition called \"endometriosis.\" These IUDs might help reduce the pain.   Want birth control for up to 8 years, depending on which device you choose  Does it hurt to have an IUD put in? -- You will likely feel some discomfort and slight cramping after the nurse or doctor puts the IUD in your uterus. People who have never given birth might feel more discomfort than people who have. The cramps generally go away within a day or 2. Over-the-counter pain medicines like ibuprofen (sample brand names: Advil, Motrin) or naproxen (sample brand name: Aleve) can help cramps go away faster.  After the IUD is in place, you should not be able to feel it.  Should I see a doctor or nurse? -- If you have an IUD, see your doctor or nurse right away if:   You have bad pain in your lower belly.   Your period is late or very different from normal.   You cannot feel the string of the IUD or if the string seems shorter than usual.   You think your IUD might have moved or fallen out.   You had sex with someone who has or might have an STI, or you think you have an STI.   You have an unexplained fever.  All topics are updated as new evidence becomes available and our peer review process is complete.  This topic retrieved from [employer] on: Feb 26, 2024.  Topic 05004 Version 21.0  Release: 32.2.4 - C32.56  © 2024 UpToDate, Inc. and/or its affiliates. All rights reserved.  figure 1: Female reproductive anatomy     The internal organs that make up the female reproductive system are located in the lower belly. These include the uterus, fallopian tubes, ovaries, cervix, and vagina.  The uterus has an inner lining, called the \"endometrium,\" and a thicker " "outer layer, called the \"myometrium.\"  Graphic 05738 Version 8.0  figure 2: IUDs     This picture shows 2 types of IUDs. There are different IUDs available. They are placed inside the uterus to help prevent pregnancy. Some hormonal IUDs can help reduce menstrual bleeding. The copper IUD can actually make menstrual bleeding heavier.  Graphic 19513 Version 15.0  Consumer Information Use and Disclaimer   Disclaimer: This generalized information is a limited summary of diagnosis, treatment, and/or medication information. It is not meant to be comprehensive and should be used as a tool to help the user understand and/or assess potential diagnostic and treatment options. It does NOT include all information about conditions, treatments, medications, side effects, or risks that may apply to a specific patient. It is not intended to be medical advice or a substitute for the medical advice, diagnosis, or treatment of a health care provider based on the health care provider's examination and assessment of a patient's specific and unique circumstances. Patients must speak with a health care provider for complete information about their health, medical questions, and treatment options, including any risks or benefits regarding use of medications. This information does not endorse any treatments or medications as safe, effective, or approved for treating a specific patient. UpToDate, Inc. and its affiliates disclaim any warranty or liability relating to this information or the use thereof.The use of this information is governed by the Terms of Use, available at https://www.[employer]er.com/en/know/clinical-effectiveness-terms. 2024© UpToDate, Inc. and its affiliates and/or licensors. All rights reserved.  Copyright   © 2024 UpToDate, Inc. and/or its affiliates. All rights reserved.    "

## 2025-03-19 NOTE — PROGRESS NOTES
Assessment Amy was seen today for ovarian cyst.    Diagnoses and all orders for this visit:    Left ovarian cyst  -     US pelvis complete w transvaginal; Future    Encounter for initial prescription of intrauterine contraceptive device (IUD)         Plan  LLQ pain: Reviewed CT scan report from January of this year.  A 3.9 cm left ovarian cyst was noted which was thought to be hemorrhagic versus a corpus luteal cyst.  Patient counseled that most likely this cyst was not the cause of her chronic left-sided pain.  Order placed for pelvic ultrasound for further evaluation.  Contraceptive counseling: Discussed various IUDs.  Patient also counseled that the IUDs do not prevent ovarian cyst formation.  Patient reports she is interested in the Mirena IUD.  She will return for insertion.    Subjective   Amy Leach is a 34 y.o. female here for a f/u visit.  Patient is complaining of some cramping; mainly on her left side.  Pt is still nursing.  Patient taking Micronor without issue.  Menses are q28-32 days. Pt reports she had a ParaGard IUD in the past, in 2020.  Pt also had left sided pain.  Pt reports dyspareunia mainly left sided.  Patient wants to know if this left ovarian cyst is the cause of her chronic left-sided pain.  Patient also desires to discuss contraceptive options. Pt is interested in the IUD.  Patient was in the ER 1/2/2025 due to some rib pain.  Patient had a CT scan which showed an ovarian cyst.    Patient Active Problem List   Diagnosis    S/P LEEP (loop electrosurgical excision procedure)       Gynecologic History  Patient's last menstrual period was 03/11/2025 (approximate).  The current method of family planning is oral progesterone-only contraceptive.    Past Medical History:   Diagnosis Date    Abnormal Pap smear of cervix     Varicella      Past Surgical History:   Procedure Laterality Date    LASIK      [place] CONIZATION CERVIX W/WO D&C RPR ELTRD EXC N/A 4/28/2021    Procedure: BIOPSY LEEP CERVIX;   Surgeon: Nii Stewart MD;  Location: [facility] OR;  Service: Gynecology     Family History   Problem Relation Age of Onset    No Known Problems Mother     Inguinal hernia Father     Other Father         Prostate surgery    Rashes / Skin problems Father     No Known Problems Sister     No Known Problems Brother     Stroke Maternal Grandmother     Cirrhosis Maternal Grandfather     Diabetes Paternal Grandmother     Lymphoma Paternal Grandmother     Ulcers Paternal Grandmother     Cancer Paternal Grandmother     Prostate cancer Paternal Grandfather     Cancer Paternal Grandfather         Prostatic    Breast cancer Neg Hx     Colon cancer Neg Hx     Ovarian cancer Neg Hx      Social History     Socioeconomic History    Marital status:      Spouse name: Not on file    Number of children: Not on file    Years of education: Not on file    Highest education level: Not on file   Occupational History    Not on file   Tobacco Use    Smoking status: Never    Smokeless tobacco: Never   Vaping Use    Vaping status: Never Used   Substance and Sexual Activity    Alcohol use: Not Currently    Drug use: Never    Sexual activity: Yes     Partners: Male     Birth control/protection: None   Other Topics Concern    Not on file   Social History Narrative    Not on file     Social Drivers of Health     Financial Resource Strain: Low Risk  (9/8/2022)    Overall Financial Resource Strain (CARDIA)     Difficulty of Paying Living Expenses: Not hard at all   Food Insecurity: No Food Insecurity (9/8/2022)    Hunger Vital Sign     Worried About Running Out of Food in the Last Year: Never true     Ran Out of Food in the Last Year: Never true   Transportation Needs: No Transportation Needs (9/8/2022)    PRAPARE - Transportation     Lack of Transportation (Medical): No     Lack of Transportation (Non-Medical): No   Physical Activity: Insufficiently Active (9/7/2021)    Exercise Vital Sign     Days of Exercise per Week: 3 days     Minutes of  Exercise per Session: 30 min   Stress: No Stress Concern Present (9/7/2021)    [place] [employer] - Occupational Stress Questionnaire     Feeling of Stress : Only a little   Social Connections: Moderately Isolated (9/7/2021)    Social Connection and Isolation Panel [NHANES]     Frequency of Communication with Friends and Family: More than three times a week     Frequency of Social Gatherings with Friends and Family: Never     Attends L[employer] Services: Never     Active Member of Clubs or Organizations: No     Attends Club or Organization Meetings: Never     Marital Status: Living with partner   Intimate Partner Violence: Not At Risk (9/7/2021)    Humiliation, Afraid, Rape, and Kick questionnaire     Fear of Current or Ex-Partner: No     Emotionally Abused: No     Physically Abused: No     Sexually Abused: No   Housing Stability: Low Risk  (9/7/2021)    Housing Stability Vital Sign     Unable to Pay for Housing in the Last Year: No     Number of Places Lived in the Last Year: 1     Unstable Housing in the Last Year: No     No Known Allergies    Current Outpatient Medications:     magnesium 30 MG tablet, Take 30 mg by mouth 2 (two) times a day, Disp: , Rfl:     norethindrone (MICRONOR) 0.35 MG tablet, Take 1 tablet (0.35 mg total) by mouth daily, Disp: 84 tablet, Rfl: 3    benzonatate (TESSALON) 200 MG capsule, TAKE 1 CAPSULE BY MOUTH 2 TO 3 TIMES PER DAY AS NEEDED FOR COUGH, Disp: , Rfl:     predniSONE 20 mg tablet, Take 20 mg by mouth daily, Disp: , Rfl:     Prenatal 27-1 MG TABS, Take 1 tablet by mouth in the morning, Disp: 30 tablet, Rfl: 2    saccharomyces boulardii (FLORASTOR) 250 mg capsule, Take 250 mg by mouth 1x/day, Disp: , Rfl:     Review of Systems  Constitutional :no fever, feels well, no tiredness, no recent weight gain or loss  ENT: no ear ache, no loss of hearing, no nosebleeds or nasal discharge, no sore throat or hoarseness.  Cardiovascular: no complaints of slow or fast heart  "beat, no chest pain, no palpitations, no leg claudication or lower extremity edema.  Respiratory: no complaints of shortness of shortness of breath, no STOCKTON  Breasts:no complaints of breast pain, breast lump, or nipple discharge  Gastrointestinal: no complaints of abdominal pain, constipation, nausea, vomiting, or diarrhea or bloody stools  Genitourinary : no complaints of dysuria, incontinence, help today +pelvic pain, no dysmenorrhea, vaginal discharge or abnormal vaginal bleeding and as noted in HPI.  Musculoskeletal: no complaints of arthralgia, no myalgia, no joint swelling or stiffness, no limb pain or swelling.  Integumentary: no complaints of skin rash or lesion, itching or dry skin  Neurological: no complaints of headache, no confusion, no numbness or tingling, no dizziness or fainting     Objective     /60   Ht 5' 3\" (1.6 m)   Wt 64.1 kg (141 lb 6.4 oz)   LMP 03/11/2025 (Approximate)   BMI 25.05 kg/m²     General Appears stated age, cooperative, alert normal mood and affect   Psychiatric oriented to person, place and time.  Mood and affect normal      "

## 2025-05-15 RX ORDER — ALBUTEROL SULFATE 90 UG/1
2 INHALANT RESPIRATORY (INHALATION) EVERY 4 HOURS PRN
COMMUNITY
Start: 2024-12-22

## 2025-05-20 ENCOUNTER — PROCEDURE VISIT (OUTPATIENT)
Dept: OBGYN CLINIC | Facility: MEDICAL CENTER | Age: 35
End: 2025-05-20

## 2025-05-20 VITALS
DIASTOLIC BLOOD PRESSURE: 65 MMHG | BODY MASS INDEX: 25.69 KG/M2 | WEIGHT: 145 LBS | SYSTOLIC BLOOD PRESSURE: 110 MMHG | HEIGHT: 63 IN

## 2025-05-20 DIAGNOSIS — Z30.430 ENCOUNTER FOR IUD INSERTION: Primary | ICD-10-CM

## 2025-05-20 DIAGNOSIS — Z01.812 PRE-PROCEDURE LAB EXAM: ICD-10-CM

## 2025-05-20 DIAGNOSIS — L73.9 FOLLICULITIS: ICD-10-CM

## 2025-05-20 RX ORDER — DOXYCYCLINE 100 MG/1
100 CAPSULE ORAL EVERY 12 HOURS SCHEDULED
Qty: 14 CAPSULE | Refills: 0 | Status: CANCELLED | OUTPATIENT
Start: 2025-05-20 | End: 2025-05-27

## 2025-05-20 RX ORDER — CEPHALEXIN 500 MG/1
500 CAPSULE ORAL EVERY 6 HOURS SCHEDULED
Qty: 28 CAPSULE | Refills: 0 | Status: SHIPPED | OUTPATIENT
Start: 2025-05-20 | End: 2025-05-27

## 2025-05-20 NOTE — PROGRESS NOTES
Assessment Amy was seen today for procedure.    Diagnoses and all orders for this visit:    Encounter for IUD insertion  -     IUD Procedure    Pre-procedure lab exam  -     POCT urine HCG    Folliculitis  -     cephalexin (KEFLEX) 500 mg capsule; Take 1 capsule (500 mg total) by mouth every 6 (six) hours for 7 days         Plan  Mirena IUD placed today.  She will follow-up in 1 month.  Folliculitis: Prescription for Keflex x 7 days sent to patient's pharmacy.  Patient counseled that if this did not resolve, she may need to present to the office for an I&D of the lesion.    Subjective   Amy Leach is a 35 y.o. female here for an IUD insertion.  Patient reports recently she felt like she has a lump near her clitoris.  It is painful.  Has not been able to do her spin classes.  Patient is currently nursing.    Problem List[1]    Gynecologic History  Patient's last menstrual period was 05/18/2025.  The current method of family planning is oral progesterone-only contraceptive.    Past Medical History:   Diagnosis Date    Abnormal Pap smear of cervix     Varicella      Past Surgical History:   Procedure Laterality Date    LASIK      NV CONIZATION CERVIX W/WO D&C RPR ELTRD EXC N/A 4/28/2021    Procedure: BIOPSY LEEP CERVIX;  Surgeon: Nii Stewart MD;  Location: [facility] OR;  Service: Gynecology     Family History   Problem Relation Age of Onset    No Known Problems Mother     Inguinal hernia Father     Other Father         Prostate surgery    Rashes / Skin problems Father     No Known Problems Sister     No Known Problems Brother     Stroke Maternal Grandmother     Cirrhosis Maternal Grandfather     Diabetes Paternal Grandmother     Lymphoma Paternal Grandmother     Ulcers Paternal Grandmother     Cancer Paternal Grandmother     Prostate cancer Paternal Grandfather     Cancer Paternal Grandfather         Prostatic    Breast cancer Neg Hx     Colon cancer Neg Hx     Ovarian cancer Neg Hx      Social History      Socioeconomic History    Marital status:      Spouse name: Not on file    Number of children: Not on file    Years of education: Not on file    Highest education level: Not on file   Occupational History    Not on file   Tobacco Use    Smoking status: Never    Smokeless tobacco: Never   Vaping Use    Vaping status: Never Used   Substance and Sexual Activity    Alcohol use: Not Currently    Drug use: Never    Sexual activity: Yes     Partners: Male     Birth control/protection: None   Other Topics Concern    Not on file   Social History Narrative    Not on file     Social Drivers of Health     Financial Resource Strain: Low Risk  (9/8/2022)    Overall Financial Resource Strain (CARDIA)     Difficulty of Paying Living Expenses: Not hard at all   Food Insecurity: No Food Insecurity (9/8/2022)    Hunger Vital Sign     Worried About Running Out of Food in the Last Year: Never true     Ran Out of Food in the Last Year: Never true   Transportation Needs: No Transportation Needs (9/8/2022)    PRAPARE - Transportation     Lack of Transportation (Medical): No     Lack of Transportation (Non-Medical): No   Physical Activity: Insufficiently Active (9/7/2021)    Exercise Vital Sign     Days of Exercise per Week: 3 days     Minutes of Exercise per Session: 30 min   Stress: No Stress Concern Present (9/7/2021)    [employer] - Occupational Stress Questionnaire     Feeling of Stress : Only a little   Social Connections: Moderately Isolated (9/7/2021)    Social Connection and Isolation Panel     Frequency of Communication with Friends and Family: More than three times a week     Frequency of Social Gatherings with Friends and Family: Never     Attends Mosque Services: Never     Active Member of Clubs or Organizations: No     Attends Club or Organization Meetings: Never     Marital Status: Living with partner   Intimate Partner Violence: Not At Risk (9/7/2021)    Humiliation, Afraid, Rape,  "and Kick questionnaire     Fear of Current or Ex-Partner: No     Emotionally Abused: No     Physically Abused: No     Sexually Abused: No   Housing Stability: Low Risk  (9/7/2021)    Housing Stability Vital Sign     Unable to Pay for Housing in the Last Year: No     Number of Places Lived in the Last Year: 1     Unstable Housing in the Last Year: No     Allergies[2]  Current Medications[3]    Review of Systems  Constitutional :no fever, feels well, no tiredness, no recent weight gain or loss  ENT: no ear ache, no loss of hearing, no nosebleeds or nasal discharge, no sore throat or hoarseness.  Cardiovascular: no complaints of slow or fast heart beat, no chest pain, no palpitations, no leg claudication or lower extremity edema.  Respiratory: no complaints of shortness of shortness of breath, no STOCKTON  Breasts:no complaints of breast pain, breast lump, or nipple discharge  Gastrointestinal: no complaints of abdominal pain, constipation, nausea, vomiting, or diarrhea or bloody stools  Genitourinary : no complaints of dysuria, incontinence, pelvic pain, no dysmenorrhea, vaginal discharge or abnormal vaginal bleeding and as noted in HPI.  Musculoskeletal: no complaints of arthralgia, no myalgia, no joint swelling or stiffness, no limb pain or swelling.  Integumentary: no complaints of skin rash or lesion, itching or dry skin  Neurological: no complaints of headache, no confusion, no numbness or tingling, no dizziness or fainting     Objective     Ht 5' 3\" (1.6 m)   Wt 65.8 kg (145 lb)   LMP 05/18/2025   Breastfeeding Yes   BMI 25.69 kg/m²     General Appears stated age, cooperative, alert normal mood and affect   Psychiatric oriented to person, place and time.  Mood and affect normal   Vulva: normal , right upper labia at the level of the clitoris patient with a 2 to 3 mm cystic lesion, tender to the touch      IUD Procedure    Date/Time: 5/20/2025 11:13 AM    Performed by: Jayne Roberts MD  Authorized by: Jayne Roberts MD  "   Written consent obtained?: Yes    Risks and benefits: Risks, benefits and alternatives were discussed    Consent given by:  Patient  Time Out:     Time out: Immediately prior to the procedure a time out was called      Time out performed at:  5/20/2025 11:13 AM  Patient states understanding of procedure being performed: Yes    Patient's understanding of procedure matches consent: Yes    Procedure consent matches procedure scheduled: Yes    Relevant documents present and verified: Yes    Test results available and properly labeled: Yes    Patient identity confirmed:  Verbally with patient  Select procedure: IUD insertion    IUD Insertion:     Negative urine pregnancy test: yes      Speculum placed in vagina: yes      Allis applied to cervix: yes      IUD inserted with no complications: yes      Strings trimmed: yes      Uterus sounded: yes      Uterus sound depth (cm):  6    IUD type:  1 each Levonorgestrel 20 MCG/DAY  Post-procedure:     Patient tolerated procedure well: yes      Patient will follow up after next period: yes    Insertion Comments:      Initial IUD Mirena IUD placed.  At the time of trimming the strings, the IUD stem could be visualized at the cervix.  This IUD was removed and a second IUD placed without issue.  Patient reports feeling lightheaded and was given water and Tylenol.            [1]   Patient Active Problem List  Diagnosis    S/P LEEP (loop electrosurgical excision procedure)   [2]   Allergies  Allergen Reactions    Azithromycin Itching and Rash   [3]   Current Outpatient Medications:     albuterol (PROVENTIL HFA,VENTOLIN HFA) 90 mcg/act inhaler, Inhale 2 puffs every 4 (four) hours as needed, Disp: , Rfl:     cephalexin (KEFLEX) 500 mg capsule, Take 1 capsule (500 mg total) by mouth every 6 (six) hours for 7 days, Disp: 28 capsule, Rfl: 0    magnesium 30 MG tablet, Take 30 mg by mouth in the morning and 30 mg in the evening., Disp: , Rfl:     norethindrone (MICRONOR) 0.35 MG tablet,  Take 1 tablet (0.35 mg total) by mouth daily (Patient not taking: Reported on 5/20/2025), Disp: 84 tablet, Rfl: 3

## 2025-08-12 ENCOUNTER — PATIENT MESSAGE (OUTPATIENT)
Dept: OBGYN CLINIC | Facility: MEDICAL CENTER | Age: 35
End: 2025-08-12

## (undated) DEVICE — LLETZ LOOP 10 X 10MM MEGADYNE

## (undated) DEVICE — GLOVE PI ULTRA TOUCH SZ.6.5

## (undated) DEVICE — PREMIUM DRY TRAY LF: Brand: MEDLINE INDUSTRIES, INC.

## (undated) DEVICE — GLOVE INDICATOR UNDERGLOVE SZ 6 BLUE

## (undated) DEVICE — BETHLEHEM UNIVERSAL MINOR VAG: Brand: CARDINAL HEALTH

## (undated) DEVICE — ELECTRODE BALL E-Z CLEAN 5 IN -0009

## (undated) DEVICE — GLOVE INDICATOR PI UNDERGLOVE SZ 6.5 BLUE

## (undated) DEVICE — EXIDINE 4 PCT

## (undated) DEVICE — PENCIL ELECTROSURG E-Z CLEAN -0035H

## (undated) DEVICE — GLOVE PI ULTRA TOUCH SZ.7.0

## (undated) DEVICE — GLOVE PI ULTRA TOUCH SZ 6

## (undated) DEVICE — PVC URETHRAL CATHETER: Brand: DOVER

## (undated) DEVICE — LLETZ LOOP 20 X 12MM MEGADYNE

## (undated) DEVICE — SCD SEQUENTIAL COMPRESSION COMFORT SLEEVE MEDIUM KNEE LENGTH: Brand: KENDALL SCD

## (undated) DEVICE — SMOKE EVACUATION TUBING WITH 7/8 IN TO 1/4 IN REDUCER: Brand: BUFFALO FILTER